# Patient Record
Sex: MALE | Race: WHITE | NOT HISPANIC OR LATINO | URBAN - METROPOLITAN AREA
[De-identification: names, ages, dates, MRNs, and addresses within clinical notes are randomized per-mention and may not be internally consistent; named-entity substitution may affect disease eponyms.]

---

## 2018-08-17 ENCOUNTER — EMERGENCY (EMERGENCY)
Facility: HOSPITAL | Age: 73
LOS: 1 days | Discharge: HOME | End: 2018-08-17
Attending: EMERGENCY MEDICINE | Admitting: EMERGENCY MEDICINE
Payer: MEDICARE

## 2018-08-17 VITALS
HEART RATE: 95 BPM | SYSTOLIC BLOOD PRESSURE: 132 MMHG | DIASTOLIC BLOOD PRESSURE: 77 MMHG | RESPIRATION RATE: 20 BRPM | TEMPERATURE: 99 F | OXYGEN SATURATION: 94 %

## 2018-08-17 DIAGNOSIS — M79.673 PAIN IN UNSPECIFIED FOOT: ICD-10-CM

## 2018-08-17 PROCEDURE — 73630 X-RAY EXAM OF FOOT: CPT

## 2018-08-17 PROCEDURE — 99283 EMERGENCY DEPT VISIT LOW MDM: CPT

## 2018-08-17 PROCEDURE — 99284 EMERGENCY DEPT VISIT MOD MDM: CPT

## 2018-08-17 PROCEDURE — 73630 X-RAY EXAM OF FOOT: CPT | Mod: 26,50

## 2018-08-17 RX ORDER — ACETAMINOPHEN 500 MG
975 TABLET ORAL ONCE
Qty: 0 | Refills: 0 | Status: COMPLETED | OUTPATIENT
Start: 2018-08-17 | End: 2018-08-17

## 2018-08-17 RX ADMIN — Medication 975 MILLIGRAM(S): at 23:02

## 2018-08-17 NOTE — ED ADULT NURSE NOTE - PMH
Anxiety    CAD (coronary artery disease)    CHF (congestive heart failure)    COPD (chronic obstructive pulmonary disease)    Depression    DM (diabetes mellitus)    GERD (gastroesophageal reflux disease)    Lung cancer    Renal insufficiency    Sleep apnea Anxiety    CAD (coronary artery disease)    CHF (congestive heart failure)    COPD (chronic obstructive pulmonary disease)    Depression    DM (diabetes mellitus)    GERD (gastroesophageal reflux disease)    Lung cancer    PVD (peripheral vascular disease)    Renal insufficiency    Sleep apnea

## 2018-08-17 NOTE — ED PROVIDER NOTE - OBJECTIVE STATEMENT
Pt from assisted living, hx cad, dm, chronic weakness. C/o bilateral foot pain today, no injury. No leg pain. Pt minimally ambulatory, has chronic leg swelling and hx pvd. Pt states no increase in leg swelling. no cp, no sob. pt states he has had similar pain in past, resolved with medication. Pt from assisted living, hx cad, dm with neuropathy, chronic weakness. C/o bilateral foot pain today, no injury. No leg pain. Pt minimally ambulatory, has chronic leg swelling and hx pvd. Pt states no increase in leg swelling. no cp, no sob. pt states he has had similar pain in past, resolved with medication.

## 2018-08-17 NOTE — ED PROVIDER NOTE - NS ED ROS FT
Except as otherwise indicated in HPI:  CONSTITUTIONAL: Neg  HEENT: neg  CV: neg  Resp: neg  GI: Neg  : Neg  MSK: +foot pain  SKIN: Neg  NEURO: Neg  PSYCHIATRIC: Neg  Heme/Onc: Neg

## 2018-08-17 NOTE — ED ADULT TRIAGE NOTE - CHIEF COMPLAINT QUOTE
pt co bilateral foot pain all day. right foot more than left. denies trauma. no obvious injury noted.

## 2018-08-17 NOTE — ED PROVIDER NOTE - PHYSICAL EXAMINATION
Gen:  alert, awake, chronically ill appearing  Head:  atraumatic, normocephalic  HEENT: PERRLA, EOMI, normal nose, normal oropharynx, no tonsillar edema, erythema, or exudate  CV:  rrr, nl S1, S2, no m/r/g  Pulm:  lungs CTA b/l  Abd: s/nt/nd, +BS  MSK:  moving all extremities, no back midline ttp, no stepoffs, no cva TTP; no calf ttp, mild b/l symmetric lower extremity edema (pt states at baseline), bp pulses b/l 1+ symmetric  Neuro:  grossly intact, no focal deficits  Skin:  sacral decub, dry, chronic b/l lower extremity pvd (pt states baseline and chronic, not increased)  Psych: AOx3, normal affect, no apparent risk to self or others Gen:  alert, awake, chronically ill appearing  Head:  atraumatic, normocephalic  HEENT: PERRLA, EOMI, normal nose, normal oropharynx  CV:  rrr, nl S1, S2, no m/r/g  Pulm:  lungs CTA b/l  Abd: s/nt/nd, +BS  MSK:  moving all extremities, no back midline ttp, no stepoffs, no cva TTP; no calf ttp, +ttp dorsum both feet and toes both feet, no wounds, mild/mod b/l symmetric lower extremity edema (pt states at baseline), bp pulses b/l 1+ symmetric  Neuro:  grossly intact, no focal deficits  Skin:  sacral decub, dry, chronic b/l lower extremity pvd (pt states baseline and chronic, not increased)  Psych: awake and alert, oriented, normal affect, no apparent risk to self or others Gen:  alert, awake, chronically ill appearing  Head:  atraumatic, normocephalic  HEENT: PERRLA  CV:  rrr, nl S1, S2, no m/r/g  Pulm:  lungs CTA b/l  Abd: s/nt/nd, +BS  MSK:  moving all extremities, no back midline ttp, no stepoffs, no cva TTP; no calf ttp, +ttp dorsum both feet and toes both feet, no wounds, mild/mod b/l symmetric lower extremity edema (pt states at baseline), bp pulses b/l 1+ symmetric; no coldness to extremities, no evidence of infection  Neuro:  grossly intact, no focal deficits  Skin:  sacral decub, dry, chronic b/l lower extremity pvd (pt states baseline and chronic, not increased)  Psych: awake and alert, oriented, normal affect, no apparent risk to self or others

## 2018-08-17 NOTE — ED ADULT NURSE NOTE - NSIMPLEMENTINTERV_GEN_ALL_ED
Implemented All Fall Risk Interventions:  [place] to call system. Call bell, personal items and telephone within reach. Instruct patient to call for assistance. Room bathroom lighting operational. Non-slip footwear when patient is off stretcher. Physically safe environment: no spills, clutter or unnecessary equipment. Stretcher in lowest position, wheels locked, appropriate side rails in place. Provide visual cue, wrist band, yellow gown, etc. Monitor gait and stability. Monitor for mental status changes and reorient to person, place, and time. Review medications for side effects contributing to fall risk. Reinforce activity limits and safety measures with patient and family.

## 2018-08-18 VITALS
RESPIRATION RATE: 18 BRPM | DIASTOLIC BLOOD PRESSURE: 67 MMHG | SYSTOLIC BLOOD PRESSURE: 113 MMHG | OXYGEN SATURATION: 94 %

## 2018-08-18 PROBLEM — Z00.00 ENCOUNTER FOR PREVENTIVE HEALTH EXAMINATION: Status: ACTIVE | Noted: 2018-08-18

## 2018-08-18 RX ORDER — ACETAMINOPHEN 500 MG
2 TABLET ORAL
Qty: 30 | Refills: 0 | OUTPATIENT
Start: 2018-08-18

## 2018-08-24 ENCOUNTER — EMERGENCY (EMERGENCY)
Facility: HOSPITAL | Age: 73
LOS: 1 days | Discharge: HOME | End: 2018-08-24
Attending: EMERGENCY MEDICINE | Admitting: EMERGENCY MEDICINE
Payer: MEDICARE

## 2018-08-24 VITALS
TEMPERATURE: 98 F | OXYGEN SATURATION: 94 % | HEART RATE: 53 BPM | RESPIRATION RATE: 16 BRPM | DIASTOLIC BLOOD PRESSURE: 51 MMHG | SYSTOLIC BLOOD PRESSURE: 97 MMHG | WEIGHT: 250 LBS

## 2018-08-24 VITALS
DIASTOLIC BLOOD PRESSURE: 53 MMHG | SYSTOLIC BLOOD PRESSURE: 118 MMHG | RESPIRATION RATE: 19 BRPM | HEART RATE: 58 BPM | OXYGEN SATURATION: 91 %

## 2018-08-24 PROBLEM — F41.9 ANXIETY DISORDER, UNSPECIFIED: Chronic | Status: ACTIVE | Noted: 2018-08-17

## 2018-08-24 PROBLEM — I73.9 PERIPHERAL VASCULAR DISEASE, UNSPECIFIED: Chronic | Status: ACTIVE | Noted: 2018-08-17

## 2018-08-24 PROBLEM — N28.9 DISORDER OF KIDNEY AND URETER, UNSPECIFIED: Chronic | Status: ACTIVE | Noted: 2018-08-17

## 2018-08-24 PROBLEM — I50.9 HEART FAILURE, UNSPECIFIED: Chronic | Status: ACTIVE | Noted: 2018-08-17

## 2018-08-24 PROBLEM — G47.30 SLEEP APNEA, UNSPECIFIED: Chronic | Status: ACTIVE | Noted: 2018-08-17

## 2018-08-24 PROBLEM — J44.9 CHRONIC OBSTRUCTIVE PULMONARY DISEASE, UNSPECIFIED: Chronic | Status: ACTIVE | Noted: 2018-08-17

## 2018-08-24 PROBLEM — C34.90 MALIGNANT NEOPLASM OF UNSPECIFIED PART OF UNSPECIFIED BRONCHUS OR LUNG: Chronic | Status: ACTIVE | Noted: 2018-08-17

## 2018-08-24 PROBLEM — K21.9 GASTRO-ESOPHAGEAL REFLUX DISEASE WITHOUT ESOPHAGITIS: Chronic | Status: ACTIVE | Noted: 2018-08-17

## 2018-08-24 PROBLEM — I25.10 ATHEROSCLEROTIC HEART DISEASE OF NATIVE CORONARY ARTERY WITHOUT ANGINA PECTORIS: Chronic | Status: ACTIVE | Noted: 2018-08-17

## 2018-08-24 PROBLEM — E11.9 TYPE 2 DIABETES MELLITUS WITHOUT COMPLICATIONS: Chronic | Status: ACTIVE | Noted: 2018-08-17

## 2018-08-24 PROBLEM — F32.9 MAJOR DEPRESSIVE DISORDER, SINGLE EPISODE, UNSPECIFIED: Chronic | Status: ACTIVE | Noted: 2018-08-17

## 2018-08-24 LAB
ALBUMIN SERPL ELPH-MCNC: 2.1 G/DL — LOW (ref 3.3–5)
ALP SERPL-CCNC: 85 U/L — SIGNIFICANT CHANGE UP (ref 40–120)
ALT FLD-CCNC: 17 U/L DA — SIGNIFICANT CHANGE UP (ref 10–45)
ANION GAP SERPL CALC-SCNC: 9 MMOL/L — SIGNIFICANT CHANGE UP (ref 5–17)
AST SERPL-CCNC: 21 U/L — SIGNIFICANT CHANGE UP (ref 10–40)
BASOPHILS # BLD AUTO: 0 K/UL — SIGNIFICANT CHANGE UP (ref 0–0.2)
BASOPHILS NFR BLD AUTO: 0.3 % — SIGNIFICANT CHANGE UP (ref 0–2)
BILIRUB SERPL-MCNC: 0.5 MG/DL — SIGNIFICANT CHANGE UP (ref 0.2–1.2)
BUN SERPL-MCNC: 42 MG/DL — HIGH (ref 7–23)
CALCIUM SERPL-MCNC: 7.9 MG/DL — LOW (ref 8.4–10.5)
CHLORIDE SERPL-SCNC: 102 MMOL/L — SIGNIFICANT CHANGE UP (ref 96–108)
CK SERPL-CCNC: 208 U/L — HIGH (ref 30–200)
CO2 SERPL-SCNC: 24 MMOL/L — SIGNIFICANT CHANGE UP (ref 22–31)
CREAT SERPL-MCNC: 2.69 MG/DL — HIGH (ref 0.5–1.3)
EOSINOPHIL # BLD AUTO: 0.2 K/UL — SIGNIFICANT CHANGE UP (ref 0–0.5)
EOSINOPHIL NFR BLD AUTO: 1.2 % — SIGNIFICANT CHANGE UP (ref 0–6)
GLUCOSE SERPL-MCNC: 195 MG/DL — HIGH (ref 70–99)
HCT VFR BLD CALC: 27.1 % — LOW (ref 39–50)
HGB BLD-MCNC: 9 G/DL — LOW (ref 13–17)
LYMPHOCYTES # BLD AUTO: 0.8 K/UL — LOW (ref 1–3.3)
LYMPHOCYTES # BLD AUTO: 6.4 % — LOW (ref 13–44)
MCHC RBC-ENTMCNC: 29.4 PG — SIGNIFICANT CHANGE UP (ref 27–34)
MCHC RBC-ENTMCNC: 33.2 GM/DL — SIGNIFICANT CHANGE UP (ref 32–36)
MCV RBC AUTO: 88.6 FL — SIGNIFICANT CHANGE UP (ref 80–100)
MONOCYTES # BLD AUTO: 1 K/UL — HIGH (ref 0–0.9)
MONOCYTES NFR BLD AUTO: 8 % — SIGNIFICANT CHANGE UP (ref 1–9)
NEUTROPHILS # BLD AUTO: 10.9 K/UL — HIGH (ref 1.8–7.4)
NEUTROPHILS NFR BLD AUTO: 84.1 % — HIGH (ref 43–77)
PLATELET # BLD AUTO: 166 K/UL — SIGNIFICANT CHANGE UP (ref 150–400)
POTASSIUM SERPL-MCNC: 4.2 MMOL/L — SIGNIFICANT CHANGE UP (ref 3.5–5.3)
POTASSIUM SERPL-SCNC: 4.2 MMOL/L — SIGNIFICANT CHANGE UP (ref 3.5–5.3)
PROT SERPL-MCNC: 6.9 G/DL — SIGNIFICANT CHANGE UP (ref 6–8.3)
RBC # BLD: 3.06 M/UL — LOW (ref 4.2–5.8)
RBC # FLD: 15.2 % — HIGH (ref 10.3–14.5)
SODIUM SERPL-SCNC: 135 MMOL/L — SIGNIFICANT CHANGE UP (ref 135–145)
TROPONIN I SERPL-MCNC: <.017 NG/ML — LOW (ref 0.02–0.06)
WBC # BLD: 13 K/UL — HIGH (ref 3.8–10.5)
WBC # FLD AUTO: 13 K/UL — HIGH (ref 3.8–10.5)

## 2018-08-24 PROCEDURE — 99285 EMERGENCY DEPT VISIT HI MDM: CPT

## 2018-08-24 PROCEDURE — 93010 ELECTROCARDIOGRAM REPORT: CPT

## 2018-08-24 PROCEDURE — 71250 CT THORAX DX C-: CPT | Mod: 26

## 2018-08-24 PROCEDURE — 71045 X-RAY EXAM CHEST 1 VIEW: CPT | Mod: 26

## 2018-08-24 RX ORDER — IPRATROPIUM/ALBUTEROL SULFATE 18-103MCG
3 AEROSOL WITH ADAPTER (GRAM) INHALATION
Qty: 0 | Refills: 0 | COMMUNITY

## 2018-08-24 RX ORDER — BUDESONIDE, MICRONIZED 100 %
0 POWDER (GRAM) MISCELLANEOUS
Qty: 0 | Refills: 0 | COMMUNITY

## 2018-08-24 RX ORDER — ACETAMINOPHEN 500 MG
2 TABLET ORAL
Qty: 0 | Refills: 0 | COMMUNITY

## 2018-08-24 RX ORDER — METOCLOPRAMIDE HCL 10 MG
0 TABLET ORAL
Qty: 0 | Refills: 0 | COMMUNITY

## 2018-08-24 RX ORDER — ASPIRIN/CALCIUM CARB/MAGNESIUM 324 MG
1 TABLET ORAL
Qty: 0 | Refills: 0 | COMMUNITY

## 2018-08-24 RX ORDER — DILTIAZEM HCL 120 MG
0 CAPSULE, EXT RELEASE 24 HR ORAL
Qty: 0 | Refills: 0 | COMMUNITY

## 2018-08-24 RX ORDER — SODIUM CHLORIDE 9 MG/ML
500 INJECTION INTRAMUSCULAR; INTRAVENOUS; SUBCUTANEOUS ONCE
Qty: 0 | Refills: 0 | Status: COMPLETED | OUTPATIENT
Start: 2018-08-24 | End: 2018-08-24

## 2018-08-24 RX ORDER — GABAPENTIN 400 MG/1
0 CAPSULE ORAL
Qty: 0 | Refills: 0 | COMMUNITY

## 2018-08-24 RX ORDER — OMEPRAZOLE 10 MG/1
1 CAPSULE, DELAYED RELEASE ORAL
Qty: 0 | Refills: 0 | COMMUNITY

## 2018-08-24 RX ORDER — ATORVASTATIN CALCIUM 80 MG/1
1 TABLET, FILM COATED ORAL
Qty: 0 | Refills: 0 | COMMUNITY

## 2018-08-24 RX ORDER — ALPRAZOLAM 0.25 MG
0 TABLET ORAL
Qty: 0 | Refills: 0 | COMMUNITY

## 2018-08-24 RX ADMIN — SODIUM CHLORIDE 500 MILLILITER(S): 9 INJECTION INTRAMUSCULAR; INTRAVENOUS; SUBCUTANEOUS at 12:08

## 2018-08-24 RX ADMIN — SODIUM CHLORIDE 500 MILLILITER(S): 9 INJECTION INTRAMUSCULAR; INTRAVENOUS; SUBCUTANEOUS at 13:00

## 2018-08-24 NOTE — ED ADULT TRIAGE NOTE - CHIEF COMPLAINT QUOTE
Hypotensive during PT at [facility]. Patient has a 20 gauge in right AC placed by EMS prior to arrival. Patient received 500cc of NS

## 2018-08-24 NOTE — ED PROVIDER NOTE - MEDICAL DECISION MAKING DETAILS
Asymptomatic patient with hypotension at [facility]. Improved after fluids. Will assess labs and ekg. Asymptomatic patient with hypotension at [facility]. Improved after fluids. Will assess labs and ekg.    Xray performed because of abnml breath sounds and that pt had o2 sat of 89% however, after an extended period of observation, /60 HR 61, O2 sat 94% with RR of 23/min. Asymptomatic patient with hypotension at [facility]. Improved after fluids. Will assess labs and ekg.    Xray performed because of abnml breath sounds and that pt had o2 sat of 89% however, after an extended period of observation, /60 HR 61, O2 sat 94% with RR of 23/min. Stable for d/c . D/W Family via phone as well.

## 2018-08-24 NOTE — ED ADULT NURSE NOTE - NSIMPLEMENTINTERV_GEN_ALL_ED
Implemented All Universal Safety Interventions:  [place] to call system. Call bell, personal items and telephone within reach. Instruct patient to call for assistance. Room bathroom lighting operational. Non-slip footwear when patient is off stretcher. Physically safe environment: no spills, clutter or unnecessary equipment. Stretcher in lowest position, wheels locked, appropriate side rails in place.

## 2018-08-24 NOTE — ED PROVIDER NOTE - CHPI ED SYMPTOMS NEG
no decreased eating/drinking/no nausea/no numbness/no tingling/no weakness/no dizziness/no fever/no vomiting

## 2018-08-24 NOTE — ED PROVIDER NOTE - PROGRESS NOTE DETAILS
D/w Family. Marlys, daughter. Patient BP improved. He is still without complaints. She is greatful that the ct chest was performed as he did require it from the thoracic surgeon Dr Lemos [phone] phone, fax [phone]; I contacted his office and they have accepted fax. Patient aware and approved.

## 2018-08-24 NOTE — ED PROVIDER NOTE - OBJECTIVE STATEMENT
While in therapy, the patient was noted to have a low blood pressure of 80/50. At the time, the patient has no complaints. He only complains of neck pain,w hich he has been complaining of for a while, he says, because of his mattress. Otherwise, no headache, no chest pain, no dizziness or lightheadedness. He had breakfast this morning. No abd pain nausea or vomiting. He said he is in rehab because he doesn't walk well and his daughter put him in there. EMS gave about 500cc of fluid prior to arrival. For EMS, his BP has been SBP of 100. Here, he is 100/42    Looking at his medical record, his cardizem was changed from 30 to 120mg.

## 2018-08-24 NOTE — ED ADULT NURSE NOTE - PMH
Anxiety    CAD (coronary artery disease)    CHF (congestive heart failure)    COPD (chronic obstructive pulmonary disease)    Depression    DM (diabetes mellitus)    GERD (gastroesophageal reflux disease)    Lung cancer    PVD (peripheral vascular disease)    Renal insufficiency    Sleep apnea

## 2018-08-24 NOTE — ED PROVIDER NOTE - SKIN WOUND TYPE
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 No active drainage. + area of erythema. No fluctuance or induration. Scars indicating previous drainage noted.

## 2018-08-24 NOTE — ED ADULT NURSE NOTE - CHIEF COMPLAINT QUOTE
Hypotensive during PT at [facility]. Patient has a 20 gauge in right AC placed by EMS prior to arrival. Patient received 500cc of NS

## 2018-08-25 ENCOUNTER — INPATIENT (INPATIENT)
Facility: HOSPITAL | Age: 73
LOS: 6 days | Discharge: OTHER INSTITUTION | DRG: 871 | End: 2018-09-01
Attending: INTERNAL MEDICINE | Admitting: STUDENT IN AN ORGANIZED HEALTH CARE EDUCATION/TRAINING PROGRAM
Payer: MEDICARE

## 2018-08-25 VITALS
RESPIRATION RATE: 24 BRPM | HEART RATE: 112 BPM | OXYGEN SATURATION: 86 % | DIASTOLIC BLOOD PRESSURE: 67 MMHG | SYSTOLIC BLOOD PRESSURE: 113 MMHG | TEMPERATURE: 101 F

## 2018-08-25 DIAGNOSIS — G93.41 METABOLIC ENCEPHALOPATHY: ICD-10-CM

## 2018-08-25 DIAGNOSIS — R33.9 RETENTION OF URINE, UNSPECIFIED: ICD-10-CM

## 2018-08-25 DIAGNOSIS — J44.1 CHRONIC OBSTRUCTIVE PULMONARY DISEASE WITH (ACUTE) EXACERBATION: ICD-10-CM

## 2018-08-25 DIAGNOSIS — N30.00 ACUTE CYSTITIS WITHOUT HEMATURIA: ICD-10-CM

## 2018-08-25 DIAGNOSIS — I25.10 ATHEROSCLEROTIC HEART DISEASE OF NATIVE CORONARY ARTERY WITHOUT ANGINA PECTORIS: ICD-10-CM

## 2018-08-25 DIAGNOSIS — G47.33 OBSTRUCTIVE SLEEP APNEA (ADULT) (PEDIATRIC): ICD-10-CM

## 2018-08-25 DIAGNOSIS — I24.8 OTHER FORMS OF ACUTE ISCHEMIC HEART DISEASE: ICD-10-CM

## 2018-08-25 DIAGNOSIS — J96.01 ACUTE RESPIRATORY FAILURE WITH HYPOXIA: ICD-10-CM

## 2018-08-25 DIAGNOSIS — A41.9 SEPSIS, UNSPECIFIED ORGANISM: ICD-10-CM

## 2018-08-25 DIAGNOSIS — I50.33 ACUTE ON CHRONIC DIASTOLIC (CONGESTIVE) HEART FAILURE: ICD-10-CM

## 2018-08-25 LAB
ALBUMIN SERPL ELPH-MCNC: 2.4 G/DL — LOW (ref 3.3–5)
ALP SERPL-CCNC: 100 U/L — SIGNIFICANT CHANGE UP (ref 40–120)
ALT FLD-CCNC: 22 U/L DA — SIGNIFICANT CHANGE UP (ref 10–45)
ANION GAP SERPL CALC-SCNC: 11 MMOL/L — SIGNIFICANT CHANGE UP (ref 5–17)
APTT BLD: 26.1 SEC — LOW (ref 27.5–37.4)
AST SERPL-CCNC: 25 U/L — SIGNIFICANT CHANGE UP (ref 10–40)
BILIRUB SERPL-MCNC: 0.7 MG/DL — SIGNIFICANT CHANGE UP (ref 0.2–1.2)
BLOOD GAS COMMENTS ARTERIAL: SIGNIFICANT CHANGE UP
BUN SERPL-MCNC: 41 MG/DL — HIGH (ref 7–23)
CALCIUM SERPL-MCNC: 8.5 MG/DL — SIGNIFICANT CHANGE UP (ref 8.4–10.5)
CHLORIDE SERPL-SCNC: 101 MMOL/L — SIGNIFICANT CHANGE UP (ref 96–108)
CK SERPL-CCNC: 132 U/L — SIGNIFICANT CHANGE UP (ref 30–200)
CO2 BLDA-SCNC: 21 MMOL/L — LOW (ref 22–30)
CO2 SERPL-SCNC: 23 MMOL/L — SIGNIFICANT CHANGE UP (ref 22–31)
CREAT SERPL-MCNC: 2.45 MG/DL — HIGH (ref 0.5–1.3)
GLUCOSE BLDC GLUCOMTR-MCNC: 162 MG/DL — HIGH (ref 70–99)
GLUCOSE BLDC GLUCOMTR-MCNC: 199 MG/DL — HIGH (ref 70–99)
GLUCOSE BLDC GLUCOMTR-MCNC: 201 MG/DL — HIGH (ref 70–99)
GLUCOSE SERPL-MCNC: 136 MG/DL — HIGH (ref 70–99)
HCT VFR BLD CALC: 29.6 % — LOW (ref 39–50)
HGB BLD-MCNC: 9.9 G/DL — LOW (ref 13–17)
HOROWITZ INDEX BLDA+IHG-RTO: SIGNIFICANT CHANGE UP
INR BLD: 1.32 RATIO — HIGH (ref 0.88–1.16)
LACTATE SERPL-SCNC: 2.2 MMOL/L — HIGH (ref 0.7–2)
MCHC RBC-ENTMCNC: 29.8 PG — SIGNIFICANT CHANGE UP (ref 27–34)
MCHC RBC-ENTMCNC: 33.3 GM/DL — SIGNIFICANT CHANGE UP (ref 32–36)
MCV RBC AUTO: 89.5 FL — SIGNIFICANT CHANGE UP (ref 80–100)
NT-PROBNP SERPL-SCNC: 5447 PG/ML — HIGH (ref 0–300)
PCO2 BLDA: 40 MMHG — SIGNIFICANT CHANGE UP (ref 32–46)
PH BLDA: 7.31 — LOW (ref 7.35–7.45)
PLATELET # BLD AUTO: 186 K/UL — SIGNIFICANT CHANGE UP (ref 150–400)
PO2 BLDA: 81 MMHG — SIGNIFICANT CHANGE UP (ref 74–108)
POTASSIUM SERPL-MCNC: 4.3 MMOL/L — SIGNIFICANT CHANGE UP (ref 3.5–5.3)
POTASSIUM SERPL-SCNC: 4.3 MMOL/L — SIGNIFICANT CHANGE UP (ref 3.5–5.3)
PROT SERPL-MCNC: 7.6 G/DL — SIGNIFICANT CHANGE UP (ref 6–8.3)
PROTHROM AB SERPL-ACNC: 14.7 SEC — HIGH (ref 9.8–12.7)
RBC # BLD: 3.31 M/UL — LOW (ref 4.2–5.8)
RBC # FLD: 15.4 % — HIGH (ref 10.3–14.5)
SAO2 % BLDA: 96 % — SIGNIFICANT CHANGE UP (ref 92–96)
SODIUM SERPL-SCNC: 135 MMOL/L — SIGNIFICANT CHANGE UP (ref 135–145)
TROPONIN I SERPL-MCNC: 0.88 NG/ML — HIGH (ref 0.02–0.06)
TROPONIN I SERPL-MCNC: 6.55 NG/ML — HIGH (ref 0.02–0.06)
WBC # BLD: 14.7 K/UL — HIGH (ref 3.8–10.5)
WBC # FLD AUTO: 14.7 K/UL — HIGH (ref 3.8–10.5)

## 2018-08-25 PROCEDURE — 71045 X-RAY EXAM CHEST 1 VIEW: CPT | Mod: 26

## 2018-08-25 PROCEDURE — 99223 1ST HOSP IP/OBS HIGH 75: CPT

## 2018-08-25 PROCEDURE — 93010 ELECTROCARDIOGRAM REPORT: CPT | Mod: 76

## 2018-08-25 PROCEDURE — 74176 CT ABD & PELVIS W/O CONTRAST: CPT | Mod: 26

## 2018-08-25 PROCEDURE — 99285 EMERGENCY DEPT VISIT HI MDM: CPT

## 2018-08-25 RX ORDER — ATORVASTATIN CALCIUM 80 MG/1
20 TABLET, FILM COATED ORAL AT BEDTIME
Qty: 0 | Refills: 0 | Status: DISCONTINUED | OUTPATIENT
Start: 2018-08-25 | End: 2018-09-01

## 2018-08-25 RX ORDER — DEXTROSE 50 % IN WATER 50 %
25 SYRINGE (ML) INTRAVENOUS ONCE
Qty: 0 | Refills: 0 | Status: DISCONTINUED | OUTPATIENT
Start: 2018-08-25 | End: 2018-09-01

## 2018-08-25 RX ORDER — ACETAMINOPHEN 500 MG
500 TABLET ORAL EVERY 6 HOURS
Qty: 0 | Refills: 0 | Status: DISCONTINUED | OUTPATIENT
Start: 2018-08-25 | End: 2018-08-25

## 2018-08-25 RX ORDER — ASPIRIN/CALCIUM CARB/MAGNESIUM 324 MG
81 TABLET ORAL DAILY
Qty: 0 | Refills: 0 | Status: DISCONTINUED | OUTPATIENT
Start: 2018-08-25 | End: 2018-09-01

## 2018-08-25 RX ORDER — INSULIN GLARGINE 100 [IU]/ML
40 INJECTION, SOLUTION SUBCUTANEOUS
Qty: 0 | Refills: 0 | COMMUNITY

## 2018-08-25 RX ORDER — AZTREONAM 2 G
1000 VIAL (EA) INJECTION ONCE
Qty: 0 | Refills: 0 | Status: COMPLETED | OUTPATIENT
Start: 2018-08-25 | End: 2018-08-25

## 2018-08-25 RX ORDER — ACETAMINOPHEN 500 MG
975 TABLET ORAL ONCE
Qty: 0 | Refills: 0 | Status: COMPLETED | OUTPATIENT
Start: 2018-08-25 | End: 2018-08-25

## 2018-08-25 RX ORDER — HEPARIN SODIUM 5000 [USP'U]/ML
5000 INJECTION INTRAVENOUS; SUBCUTANEOUS EVERY 8 HOURS
Qty: 0 | Refills: 0 | Status: DISCONTINUED | OUTPATIENT
Start: 2018-08-25 | End: 2018-08-25

## 2018-08-25 RX ORDER — GABAPENTIN 400 MG/1
300 CAPSULE ORAL AT BEDTIME
Qty: 0 | Refills: 0 | Status: DISCONTINUED | OUTPATIENT
Start: 2018-08-25 | End: 2018-09-01

## 2018-08-25 RX ORDER — DULOXETINE HYDROCHLORIDE 30 MG/1
30 CAPSULE, DELAYED RELEASE ORAL DAILY
Qty: 0 | Refills: 0 | Status: DISCONTINUED | OUTPATIENT
Start: 2018-08-25 | End: 2018-08-27

## 2018-08-25 RX ORDER — GLUCAGON INJECTION, SOLUTION 0.5 MG/.1ML
1 INJECTION, SOLUTION SUBCUTANEOUS ONCE
Qty: 0 | Refills: 0 | Status: DISCONTINUED | OUTPATIENT
Start: 2018-08-25 | End: 2018-09-01

## 2018-08-25 RX ORDER — IPRATROPIUM/ALBUTEROL SULFATE 18-103MCG
3 AEROSOL WITH ADAPTER (GRAM) INHALATION EVERY 4 HOURS
Qty: 0 | Refills: 0 | Status: DISCONTINUED | OUTPATIENT
Start: 2018-08-25 | End: 2018-08-28

## 2018-08-25 RX ORDER — INSULIN GLARGINE 100 [IU]/ML
40 INJECTION, SOLUTION SUBCUTANEOUS EVERY MORNING
Qty: 0 | Refills: 0 | Status: DISCONTINUED | OUTPATIENT
Start: 2018-08-25 | End: 2018-09-01

## 2018-08-25 RX ORDER — DULOXETINE HYDROCHLORIDE 30 MG/1
60 CAPSULE, DELAYED RELEASE ORAL DAILY
Qty: 0 | Refills: 0 | Status: DISCONTINUED | OUTPATIENT
Start: 2018-08-25 | End: 2018-08-25

## 2018-08-25 RX ORDER — FUROSEMIDE 40 MG
40 TABLET ORAL ONCE
Qty: 0 | Refills: 0 | Status: COMPLETED | OUTPATIENT
Start: 2018-08-25 | End: 2018-08-25

## 2018-08-25 RX ORDER — CARVEDILOL PHOSPHATE 80 MG/1
25 CAPSULE, EXTENDED RELEASE ORAL EVERY 12 HOURS
Qty: 0 | Refills: 0 | Status: DISCONTINUED | OUTPATIENT
Start: 2018-08-25 | End: 2018-09-01

## 2018-08-25 RX ORDER — BUDESONIDE, MICRONIZED 100 %
1 POWDER (GRAM) MISCELLANEOUS DAILY
Qty: 0 | Refills: 0 | Status: DISCONTINUED | OUTPATIENT
Start: 2018-08-25 | End: 2018-09-01

## 2018-08-25 RX ORDER — ASPIRIN/CALCIUM CARB/MAGNESIUM 324 MG
325 TABLET ORAL ONCE
Qty: 0 | Refills: 0 | Status: COMPLETED | OUTPATIENT
Start: 2018-08-25 | End: 2018-08-25

## 2018-08-25 RX ORDER — AZTREONAM 2 G
500 VIAL (EA) INJECTION EVERY 12 HOURS
Qty: 0 | Refills: 0 | Status: DISCONTINUED | OUTPATIENT
Start: 2018-08-25 | End: 2018-09-01

## 2018-08-25 RX ORDER — HEPARIN SODIUM 5000 [USP'U]/ML
INJECTION INTRAVENOUS; SUBCUTANEOUS
Qty: 25000 | Refills: 0 | Status: DISCONTINUED | OUTPATIENT
Start: 2018-08-25 | End: 2018-08-27

## 2018-08-25 RX ORDER — HEPARIN SODIUM 5000 [USP'U]/ML
6000 INJECTION INTRAVENOUS; SUBCUTANEOUS EVERY 6 HOURS
Qty: 0 | Refills: 0 | Status: DISCONTINUED | OUTPATIENT
Start: 2018-08-25 | End: 2018-09-01

## 2018-08-25 RX ORDER — DEXTROSE 50 % IN WATER 50 %
12.5 SYRINGE (ML) INTRAVENOUS ONCE
Qty: 0 | Refills: 0 | Status: DISCONTINUED | OUTPATIENT
Start: 2018-08-25 | End: 2018-09-01

## 2018-08-25 RX ORDER — VANCOMYCIN HCL 1 G
1000 VIAL (EA) INTRAVENOUS ONCE
Qty: 0 | Refills: 0 | Status: DISCONTINUED | OUTPATIENT
Start: 2018-08-25 | End: 2018-08-25

## 2018-08-25 RX ORDER — DULOXETINE HYDROCHLORIDE 30 MG/1
1 CAPSULE, DELAYED RELEASE ORAL
Qty: 0 | Refills: 0 | COMMUNITY

## 2018-08-25 RX ORDER — DEXTROSE 50 % IN WATER 50 %
15 SYRINGE (ML) INTRAVENOUS ONCE
Qty: 0 | Refills: 0 | Status: DISCONTINUED | OUTPATIENT
Start: 2018-08-25 | End: 2018-09-01

## 2018-08-25 RX ORDER — SODIUM CHLORIDE 9 MG/ML
1000 INJECTION, SOLUTION INTRAVENOUS
Qty: 0 | Refills: 0 | Status: DISCONTINUED | OUTPATIENT
Start: 2018-08-25 | End: 2018-09-01

## 2018-08-25 RX ORDER — HEPARIN SODIUM 5000 [USP'U]/ML
5000 INJECTION INTRAVENOUS; SUBCUTANEOUS ONCE
Qty: 0 | Refills: 0 | Status: COMPLETED | OUTPATIENT
Start: 2018-08-25 | End: 2018-08-25

## 2018-08-25 RX ORDER — TAMSULOSIN HYDROCHLORIDE 0.4 MG/1
0.8 CAPSULE ORAL AT BEDTIME
Qty: 0 | Refills: 0 | Status: DISCONTINUED | OUTPATIENT
Start: 2018-08-25 | End: 2018-09-01

## 2018-08-25 RX ORDER — METOCLOPRAMIDE HCL 10 MG
10 TABLET ORAL AT BEDTIME
Qty: 0 | Refills: 0 | Status: DISCONTINUED | OUTPATIENT
Start: 2018-08-25 | End: 2018-09-01

## 2018-08-25 RX ORDER — PANTOPRAZOLE SODIUM 20 MG/1
40 TABLET, DELAYED RELEASE ORAL
Qty: 0 | Refills: 0 | Status: DISCONTINUED | OUTPATIENT
Start: 2018-08-25 | End: 2018-09-01

## 2018-08-25 RX ORDER — ALPRAZOLAM 0.25 MG
0.5 TABLET ORAL AT BEDTIME
Qty: 0 | Refills: 0 | Status: COMPLETED | OUTPATIENT
Start: 2018-08-25 | End: 2018-09-01

## 2018-08-25 RX ORDER — INSULIN LISPRO 100/ML
VIAL (ML) SUBCUTANEOUS
Qty: 0 | Refills: 0 | Status: DISCONTINUED | OUTPATIENT
Start: 2018-08-25 | End: 2018-09-01

## 2018-08-25 RX ORDER — ACETAMINOPHEN 500 MG
650 TABLET ORAL EVERY 6 HOURS
Qty: 0 | Refills: 0 | Status: DISCONTINUED | OUTPATIENT
Start: 2018-08-25 | End: 2018-09-01

## 2018-08-25 RX ORDER — CLOPIDOGREL BISULFATE 75 MG/1
75 TABLET, FILM COATED ORAL DAILY
Qty: 0 | Refills: 0 | Status: DISCONTINUED | OUTPATIENT
Start: 2018-08-25 | End: 2018-09-01

## 2018-08-25 RX ADMIN — Medication 100 MILLIGRAM(S): at 12:01

## 2018-08-25 RX ADMIN — HEPARIN SODIUM 5000 UNIT(S): 5000 INJECTION INTRAVENOUS; SUBCUTANEOUS at 18:51

## 2018-08-25 RX ADMIN — CLOPIDOGREL BISULFATE 75 MILLIGRAM(S): 75 TABLET, FILM COATED ORAL at 22:38

## 2018-08-25 RX ADMIN — HEPARIN SODIUM 1000 UNIT(S)/HR: 5000 INJECTION INTRAVENOUS; SUBCUTANEOUS at 22:37

## 2018-08-25 RX ADMIN — Medication 40 MILLIGRAM(S): at 21:49

## 2018-08-25 RX ADMIN — Medication 975 MILLIGRAM(S): at 12:01

## 2018-08-25 RX ADMIN — ATORVASTATIN CALCIUM 20 MILLIGRAM(S): 80 TABLET, FILM COATED ORAL at 21:59

## 2018-08-25 RX ADMIN — TAMSULOSIN HYDROCHLORIDE 0.8 MILLIGRAM(S): 0.4 CAPSULE ORAL at 21:54

## 2018-08-25 RX ADMIN — Medication 4: at 18:45

## 2018-08-25 RX ADMIN — Medication 3 MILLILITER(S): at 20:40

## 2018-08-25 RX ADMIN — Medication 325 MILLIGRAM(S): at 12:42

## 2018-08-25 RX ADMIN — GABAPENTIN 300 MILLIGRAM(S): 400 CAPSULE ORAL at 21:59

## 2018-08-25 RX ADMIN — Medication 40 MILLIGRAM(S): at 18:48

## 2018-08-25 RX ADMIN — Medication 50 MILLIGRAM(S): at 18:54

## 2018-08-25 RX ADMIN — Medication 0.5 MILLIGRAM(S): at 21:40

## 2018-08-25 RX ADMIN — HEPARIN SODIUM 5000 UNIT(S): 5000 INJECTION INTRAVENOUS; SUBCUTANEOUS at 22:44

## 2018-08-25 RX ADMIN — Medication 10 MILLIGRAM(S): at 21:49

## 2018-08-25 NOTE — CHART NOTE - NSCHARTNOTEFT_GEN_A_CORE
Pt re-evaluated - lab with trop 6.555 up from 0.875.  Pt is 73M hx of HTN, T2IRDM, CAD hx of MI, CHF,  COPD, CKD, HLD, CHELSEA on CPAP pw fever, SOB and admitted for sepsis sec to presumed UTI with elevated trop felt initially sec to demand ischemia. Pt currently feels improved - was SOB earlier, s/p Lasix and duoneb and now on CPAP resting comfortably. Denied any chest pain. Was in ED yesterday and sent back to [place] after an episode of asx hypotension that resolved after IVFs. Had CT chest yesterday that was negative.   Vital Signs Last 24 Hrs  T(C): 36.5 (25 Aug 2018 19:49), Max: 38.1 (25 Aug 2018 10:32)  T(F): 97.7 (25 Aug 2018 19:49), Max: 100.5 (25 Aug 2018 10:32)  HR: 68 (25 Aug 2018 20:43) (68 - 112)  BP: 128/67 (25 Aug 2018 19:49) (102/61 - 128/67)  BP(mean): --  RR: 15 (25 Aug 2018 19:49) (15 - 24)  SpO2: 98% (25 Aug 2018 21:36) (86% - 98%)  Vital Signs Last 24 Hrs  T(C): 36.5 (25 Aug 2018 19:49), Max: 38.1 (25 Aug 2018 10:32)  T(F): 97.7 (25 Aug 2018 19:49), Max: 100.5 (25 Aug 2018 10:32)  HR: 68 (25 Aug 2018 20:43) (68 - 112)  BP: 128/67 (25 Aug 2018 19:49) (102/61 - 128/67)  BP(mean): --  RR: 15 (25 Aug 2018 19:49) (15 - 24)  SpO2: 98% (25 Aug 2018 21:36) (86% - 98%)  Daily Height in cm: 182.88 (25 Aug 2018 19:49)    Daily Weight in k.6 (25 Aug 2018 21:36)  CAPILLARY BLOOD GLUCOSE      POCT Blood Glucose.: 162 mg/dL (25 Aug 2018 21:35)    I&O's Summary    GENERAL: NAD  HEAD:  Normocephalic  EYES: EOMI, PERRLA, conjunctiva and sclera clear  ENMT: No tonsillar erythema, exudates, or enlargement; Moist mucous membranes, No lesions  NECK: Supple, No JVD, no bruit, normal thyroid  NERVOUS SYSTEM:  Alert, moves all fours.   CHEST/LUNG: occ rhonchi, occ exp wheeze.   HEART: Regular rate and rhythm; No murmurs, rubs, or gallops  ABDOMEN: Soft, Nontender, Nondistended; Bowel sounds present  EXTREMITIES:  2+ Peripheral Pulses, No clubbing, cyanosis, ++ edema  LYMPH: No lymphadenopathy noted  SKIN: No rashes or lesions    CARDIAC MARKERS ( 25 Aug 2018 20:14 )  6.555 ng/mL / x     / x     / x     / x      CARDIAC MARKERS ( 25 Aug 2018 08:30 )  .875 ng/mL / x     / 132 U/L / x     / x      CARDIAC MARKERS ( 24 Aug 2018 12:00 )  <.017 ng/mL / x     / 208 U/L / x     / x        Repeat EKG: RBBB at 61 bpm no st changes.     AP: 73M hx of CAD/MI, HTN, DM, CHF, COPD, CKD with NSTEMI  case discussed with cardiologist Dr. Jo. will add on Plavix, IV heparin.   cont asa, statin and bb Pt re-evaluated - lab with trop 6.555 up from 0.875.  Pt is 73M hx of HTN, T2IRDM, CAD hx of MI, CHF,  COPD, CKD, HLD, CHELSEA on CPAP pw fever, SOB and admitted for sepsis sec to presumed UTI with elevated trop felt initially sec to demand ischemia. Pt currently feels improved - was SOB earlier, s/p Lasix and duoneb and now on CPAP resting comfortably. Denied any chest pain. Was in ED yesterday and sent back to [place] after an episode of asx hypotension that resolved after IVFs. Had CT chest yesterday that was negative.   Vital Signs Last 24 Hrs  T(C): 36.5 (25 Aug 2018 19:49), Max: 38.1 (25 Aug 2018 10:32)  T(F): 97.7 (25 Aug 2018 19:49), Max: 100.5 (25 Aug 2018 10:32)  HR: 68 (25 Aug 2018 20:43) (68 - 112)  BP: 128/67 (25 Aug 2018 19:49) (102/61 - 128/67)  BP(mean): --  RR: 15 (25 Aug 2018 19:49) (15 - 24)  SpO2: 98% (25 Aug 2018 21:36) (86% - 98%)  Vital Signs Last 24 Hrs  T(C): 36.5 (25 Aug 2018 19:49), Max: 38.1 (25 Aug 2018 10:32)  T(F): 97.7 (25 Aug 2018 19:49), Max: 100.5 (25 Aug 2018 10:32)  HR: 68 (25 Aug 2018 20:43) (68 - 112)  BP: 128/67 (25 Aug 2018 19:49) (102/61 - 128/67)  BP(mean): --  RR: 15 (25 Aug 2018 19:49) (15 - 24)  SpO2: 98% (25 Aug 2018 21:36) (86% - 98%)  Daily Height in cm: 182.88 (25 Aug 2018 19:49)    Daily Weight in k.6 (25 Aug 2018 21:36)    CAPILLARY BLOOD GLUCOSE    POCT Blood Glucose.: 162 mg/dL (25 Aug 2018 21:35)    I&O's Summary    GENERAL: NAD  HEAD:  Normocephalic  EYES: EOMI, PERRLA, conjunctiva and sclera clear  ENMT: No tonsillar erythema, exudates, or enlargement; Moist mucous membranes, No lesions  NECK: Supple, No JVD, no bruit, normal thyroid  NERVOUS SYSTEM:  Alert, moves all fours.   CHEST/LUNG: occ rhonchi, occ exp wheeze.   HEART: Regular rate and rhythm; No murmurs, rubs, or gallops  ABDOMEN: Soft, Nontender, Nondistended; Bowel sounds present  EXTREMITIES:  2+ Peripheral Pulses, No clubbing, cyanosis, ++ edema  LYMPH: No lymphadenopathy noted  SKIN: No rashes or lesions    CARDIAC MARKERS ( 25 Aug 2018 20:14 )  6.555 ng/mL / x     / x     / x     / x      CARDIAC MARKERS ( 25 Aug 2018 08:30 )  .875 ng/mL / x     / 132 U/L / x     / x      CARDIAC MARKERS ( 24 Aug 2018 12:00 )  <.017 ng/mL / x     / 208 U/L / x     / x        Repeat EKG: RBBB at 61 bpm no st changes.     AP: 73M hx of CAD/MI, HTN, DM, CHF, COPD, CKD with NSTEMI  case discussed with cardiologist Dr. Jo. will add on Plavix, IV heparin.   cont asa, statin and bb

## 2018-08-25 NOTE — ED PROVIDER NOTE - NS ED ROS FT
Constitutional: (-+) fever  (+)chills  (-)sweats  Eyes/ENT: (-) blurry vision, (-) epistaxis  (-)rhinorrhea   (-) sore throat    Cardiovascular: (-) chest pain, (-) palpitations (-) edema   Respiratory: (-) cough, (-) shortness of breath   Gastrointestinal: (-)nausea  (-)vomiting, (-) diarrhea  (-) abdominal pain   :  (-)dysuria, (+)frequency, (+)urgency, (-)hematuria  Musculoskeletal: (-) neck pain, (-) back pain, (-) joint pain  Integumentary: (-) rash, (-) edema  Neurological: (-) headache, (-) altered mental status  (-)LOC

## 2018-08-25 NOTE — ED ADULT NURSE NOTE - CHPI ED NUR SYMPTOMS NEG
no edema/no headache/no body aches/no cough/no wheezing/no chest pain/no diaphoresis/no fever/no hemoptysis/no chills/no shortness of breath

## 2018-08-25 NOTE — CONSULT NOTE ADULT - SUBJECTIVE AND OBJECTIVE BOX
NEPHROLOGY CONSULTATION    CHIEF COMPLAINT: HARINDER    HPI:  Pt is 74 yo M from [place] presented with weakness, shortness of breath, fever, seen in ED yesterday, negative CT chest, sent back, today returns with fever unable to urinate. Noted to have HARINDER. Most recent known Cr 1.61 on 9/14/15. Recent baseline unknown. Denies CP, c/o MUELLER, no N/V, D/C. Alert, poor historian. Elevated troponin noted. CT A/P c/w cystitis.    ROS:  as above    Allergies:  Avelox (Unknown)  cefadroxil (Unknown)  levofloxacin (Unknown)  sulfa drugs (Unknown)    PAST MEDICAL & SURGICAL HISTORY:  PVD (peripheral vascular disease)  Depression  Renal insufficiency  Anxiety  Sleep apnea  Lung cancer  DM (diabetes mellitus)  GERD (gastroesophageal reflux disease)  CAD (coronary artery disease)  COPD (chronic obstructive pulmonary disease)  CHF (congestive heart failure)  No significant past surgical history    SOCIAL HISTORY:  negative    FAMILY HISTORY:  No pertinent family history in first degree relatives    MEDICATIONS  (STANDING):  ALBUTerol/ipratropium for Nebulization 3 milliLiter(s) Nebulizer every 4 hours  ALPRAZolam 0.5 milliGRAM(s) Oral at bedtime  aspirin  chewable 81 milliGRAM(s) Oral daily  atorvastatin 20 milliGRAM(s) Oral at bedtime  aztreonam  IVPB 500 milliGRAM(s) IV Intermittent every 12 hours  buDESOnide   0.5 milliGRAM(s) Respule 1 milliGRAM(s) Inhalation daily  carvedilol 25 milliGRAM(s) Oral every 12 hours  dextrose 5%. 1000 milliLiter(s) (50 mL/Hr) IV Continuous <Continuous>  dextrose 50% Injectable 12.5 Gram(s) IV Push once  dextrose 50% Injectable 25 Gram(s) IV Push once  dextrose 50% Injectable 25 Gram(s) IV Push once  DULoxetine 30 milliGRAM(s) Oral daily  gabapentin 300 milliGRAM(s) Oral at bedtime  heparin  Injectable 5000 Unit(s) SubCutaneous every 8 hours  insulin glargine Injectable (LANTUS) 40 Unit(s) SubCutaneous every morning  insulin lispro (HumaLOG) corrective regimen sliding scale   SubCutaneous three times a day before meals  methylPREDNISolone sodium succinate Injectable 40 milliGRAM(s) IV Push every 8 hours  metoclopramide 10 milliGRAM(s) Oral at bedtime  pantoprazole    Tablet 40 milliGRAM(s) Oral before breakfast  tamsulosin 0.8 milliGRAM(s) Oral at bedtime    Vital Signs Last 24 Hrs  T(C): 37 (08-25-18 @ 19:03), Max: 38.1 (08-25-18 @ 10:32)  T(F): 98.6 (08-25-18 @ 19:03), Max: 100.5 (08-25-18 @ 10:32)  HR: 68 (08-25-18 @ 19:03) (68 - 112)  BP: 127/74 (08-25-18 @ 19:03) (102/61 - 127/74)  RR: 16 (08-25-18 @ 19:03) (16 - 24)  SpO2: 97% (08-25-18 @ 19:03) (86% - 97%)    Seen in ER  PERRLA, EOMI  Neck non tender, supple  Lungs good air entry b/l  Heart S1S2  Abdomen soft, ND  Extr + edema    LABS:                        9.9    14.7  )-----------( 186      ( 25 Aug 2018 08:30 )             29.6     08-25    135  |  101  |  41<H>  ----------------------------<  136<H>  4.3   |  23  |  2.45<H>    Ca    8.5      25 Aug 2018 08:30    TPro  7.6  /  Alb  2.4<L>  /  TBili  0.7  /  DBili  x   /  AST  25  /  ALT  22  /  AlkPhos  100  08-25    LIVER FUNCTIONS - ( 25 Aug 2018 08:30 )  Alb: 2.4 g/dL / Pro: 7.6 g/dL / ALK PHOS: 100 U/L / ALT: 22 U/L DA / AST: 25 U/L / GGT: x           PT/INR - ( 25 Aug 2018 08:30 )   PT: 14.7 sec;   INR: 1.32 ratio      PTT - ( 25 Aug 2018 08:30 )  PTT:26.1 sec    A/P:    HARINDER on CKD III in setting of cystitis, ? NSTEMI  Abx, ID eval, Cards eval  Gentle IVF  No nephrotoxins  CBC, BMP, CPK in am  UA, urine C and S  F/u Bld Cx  Echo  Will f/u

## 2018-08-25 NOTE — H&P ADULT - HISTORY OF PRESENT ILLNESS
Pt is a 73 y o M from [place] presented with weakness, shortness of breath, fever, seen in ED yesterday, negative CT chest, sent home, today with fever. Pt states he has had trouble urinating for the last few days.

## 2018-08-25 NOTE — H&P ADULT - PROBLEM SELECTOR PLAN 1
Continue Aztreonam, pt did not have any urine from straight cath will have to try again, CT abd to evaluate for hydronephrosis

## 2018-08-25 NOTE — ED ADULT NURSE NOTE - NSIMPLEMENTINTERV_GEN_ALL_ED
Implemented All Fall with Harm Risk Interventions:  [place] to call system. Call bell, personal items and telephone within reach. Instruct patient to call for assistance. Room bathroom lighting operational. Non-slip footwear when patient is off stretcher. Physically safe environment: no spills, clutter or unnecessary equipment. Stretcher in lowest position, wheels locked, appropriate side rails in place. Provide visual cue, wrist band, yellow gown, etc. Monitor gait and stability. Monitor for mental status changes and reorient to person, place, and time. Review medications for side effects contributing to fall risk. Reinforce activity limits and safety measures with patient and family. Provide visual clues: red socks.

## 2018-08-25 NOTE — ED PROVIDER NOTE - MEDICAL DECISION MAKING DETAILS
73 y m cc fever generalized weakness, cxr clear wbc 14 k , ua unable to obtain despite of hydration straight cath, + troponins

## 2018-08-25 NOTE — ED ADULT NURSE NOTE - OBJECTIVE STATEMENT
73 year old male A&Ox3. Chief complaint, shortness of breath and weakness for the last two days. Patient states he was in the ED yesterday for shortness of breath but feels like he's getting worse. Patient states he also has wounds to his sacrum for "a few months" that wont heal. Patient his bilateral sacral wounds 1-2 cm which are stage 2-3 with no induration and serosanguinous discharge. Patient states pain to the area is 5-6 when palpated. Patient also has a un-blanchable stage 1 that surrounds the aforementioned wounds that is 9X6 cm. Patient has no other complaints. Patient has a 20 gauge in his right A/C which is intact and flushable.

## 2018-08-25 NOTE — CHART NOTE - NSCHARTNOTEFT_GEN_A_CORE
-patient admitted for AECOPD  -uses CPAP at home, during sleep  -he states his settings are "CPAP 17 and 3Liters of oxygen"    -will start him on CPAP 10 and 50% FIO2 tonight and adjust if needed.    -no other acute issues or complaints at this     ABG on 4L NC showed:    ABG - ( 25 Aug 2018 12:40 )  pH, Arterial: 7.31  pH, Blood: x     /  pCO2: 40    /  pO2: 81    / HCO3: x     / Base Excess: x     /  SaO2: 96            -have discussed with respiratory therapy and bedside NR

## 2018-08-25 NOTE — H&P ADULT - NSHPPHYSICALEXAM_GEN_ALL_CORE
T(C): 37.8 (08-25-18 @ 11:32), Max: 38.1 (08-25-18 @ 10:32)  HR: 102 (08-25-18 @ 11:32) (58 - 112)  BP: 102/61 (08-25-18 @ 11:32) (102/61 - 130/66)  RR: 18 (08-25-18 @ 11:32) (18 - 24)  SpO2: 91% (08-25-18 @ 11:32) (86% - 94%)    PHYSICAL EXAM:  GENERAL: lethargic but arousable  HEAD:  Atraumatic, Normocephalic  EYES: EOMI, PERRLA, conjunctiva and sclera clear  ENMT: dry mucous membranes, Good dentition  NECK: Supple, No JVD  NERVOUS SYSTEM:  A/O x3, decreased concentration; CN 2-12 intact, No focal deficits, moves all extremities, 4/5 UE and LE  CHEST/LUNG: Clear to auscultation bilaterally; No rales, rhonchi, wheezing, or rubs  HEART: Regular rate and rhythm; S1/S2, No murmurs, rubs, or gallops  ABDOMEN: Soft, Nontender, Nondistended; Bowel sounds present  VASCULAR: Normal pulses, Normal capillary refill  EXTREMITIES:  2+ Peripheral Pulses, No clubbing, cyanosis; 2+ edema  SKIN: Warm, Intact, B/L LE stasis dermatitis  PSYCH: Normal mood and affect

## 2018-08-25 NOTE — H&P ADULT - ASSESSMENT
Pt is a 73 y o M from [place] presented with weakness, fever admitted for severe sepsis 2/2 presumed UTI, urinary retention, not making urine, hypovolemic, acute metabolic encephalopathy 2/2 infection, ARF on CKD IV.    DVT PPX - see DVT form scanned, will give Heparin SQ    Anemia - 2/2 chronic renal disease likely, monitor, if drops may need to transfuse    CKD IV - Renal consult

## 2018-08-25 NOTE — H&P ADULT - NSHPREVIEWOFSYSTEMS_GEN_ALL_CORE
REVIEW OF SYSTEMS:  CONSTITUTIONAL: + fever, + fatigue, +lethargy  EYES: No eye pain, visual disturbances, or discharge  ENMT:  No difficulty hearing, tinnitus, vertigo; No sinus or throat pain  NECK: No neck pain or neck stiffness  RESPIRATORY: No cough, wheezing, chills or hemoptysis; +shortness of breath  CARDIOVASCULAR: No chest pain, palpitations, dizziness, or leg swelling  GASTROINTESTINAL: No abdominal pain, No nausea, vomiting, or hematemesis; No diarrhea or constipation  GENITOURINARY: No dysuria, frequency, hematuria, or incontinence; +retention  NEUROLOGICAL: +headaches, -memory loss, +overall loss of strength, -numbness, or tremors  SKIN: No itching, burning, rashes, or lesions   ENDOCRINE: No heat or cold intolerance; No hair loss  MUSCULOSKELETAL: No joint pain or swelling; No muscle, back, or extremity pain  PSYCHIATRIC: No depression, anxiety, mood swings, or difficulty sleeping  HEME/LYMPH: No easy bruising or bleeding  ALLERY AND IMMUNOLOGIC: No hives or eczema    All other ROS reviewed and negative except as otherwise stated

## 2018-08-25 NOTE — ED PROVIDER NOTE - PHYSICAL EXAMINATION
General:     NAD, frail elderly, ill-appearing   Head:     NC/AT, EOMI, oral mucosa dry  Neck:     trachea midline  Lungs:     CTA b/l, no w/r/r  CVS:     S1S2, RRR, no m/g/r  Abd:     +BS, s/nt/nd, no organomegaly  Ext:    2+ radial and pedal pulses, no c/c/e  Neuro: AAOx3, no sensory/motor deficits

## 2018-08-25 NOTE — PATIENT PROFILE ADULT. - FALL HARM RISK
other/h/o fall/bones(Osteoporosis,prev fx,steroid use,metastatic bone ca/coagulation(Bleeding disorder R/T clinical cond/anti-coags)

## 2018-08-25 NOTE — H&P ADULT - NSHPLABSRESULTS_GEN_ALL_CORE
9.9    14.7  )-----------( 186      ( 25 Aug 2018 08:30 )             29.6     08-25    135  |  101  |  41<H>  ----------------------------<  136<H>  4.3   |  23  |  2.45<H>    Ca    8.5      25 Aug 2018 08:30    TPro  7.6  /  Alb  2.4<L>  /  TBili  0.7  /  DBili  x   /  AST  25  /  ALT  22  /  AlkPhos  100  08-25      < from: Xray Chest 1 View- PORTABLE-Routine (08.25.18 @ 12:01) >    EXAM:  XR CHEST PORTABLE ROUTINE 1V      PROCEDURE DATE:  08/25/2018        INTERPRETATION:  Clinical information: Shortness of breath.    Technique: Frontal view of the chest.    Comparison: Prior chest x-ray examination from 8/24/2018.    Findings: The lungs are clear. There is volume loss of the right lung.   The heart size is enlarged. There are mild multilevel degenerative   changes of the thoracic spine.    IMPRESSION: Unchanged cardiomegaly.    < from: CT Chest No Cont (08.24.18 @ 13:34) >    EXAM:  CT CHEST      PROCEDURE DATE:  08/24/2018        INTERPRETATION:  CT chest without contrast  There are no relevant prior studies for comparison  History hypoxia    There is moderate cardiomegaly. There is mild chronic appearing probably   postsurgical consolidation in the anterior right apex. There are several   small nodules in the upper lung ranging up to 1.5 cm in long axis   dimension. The left hemithorax is clear. There is no cavitation or   pleural or pericardial effusion. There ismoderate gynecomastia. There   are multiple right-sided renal cysts.    IMPRESSION: Findings in the right lung consistent with previous pulmonary   resection with residual or recurrent nodularity. No acute consolidation   or effusion or central edema.

## 2018-08-26 DIAGNOSIS — N28.9 DISORDER OF KIDNEY AND URETER, UNSPECIFIED: ICD-10-CM

## 2018-08-26 DIAGNOSIS — E11.9 TYPE 2 DIABETES MELLITUS WITHOUT COMPLICATIONS: ICD-10-CM

## 2018-08-26 DIAGNOSIS — I21.4 NON-ST ELEVATION (NSTEMI) MYOCARDIAL INFARCTION: ICD-10-CM

## 2018-08-26 DIAGNOSIS — D64.9 ANEMIA, UNSPECIFIED: ICD-10-CM

## 2018-08-26 DIAGNOSIS — I50.9 HEART FAILURE, UNSPECIFIED: ICD-10-CM

## 2018-08-26 DIAGNOSIS — I10 ESSENTIAL (PRIMARY) HYPERTENSION: ICD-10-CM

## 2018-08-26 DIAGNOSIS — N18.4 CHRONIC KIDNEY DISEASE, STAGE 4 (SEVERE): ICD-10-CM

## 2018-08-26 LAB
ANION GAP SERPL CALC-SCNC: 8 MMOL/L — SIGNIFICANT CHANGE UP (ref 5–17)
APTT BLD: 36.5 SEC — SIGNIFICANT CHANGE UP (ref 27.5–37.4)
APTT BLD: 45.5 SEC — HIGH (ref 27.5–37.4)
APTT BLD: 47 SEC — HIGH (ref 27.5–37.4)
BUN SERPL-MCNC: 39 MG/DL — HIGH (ref 7–23)
CALCIUM SERPL-MCNC: 8.1 MG/DL — LOW (ref 8.4–10.5)
CHLORIDE SERPL-SCNC: 104 MMOL/L — SIGNIFICANT CHANGE UP (ref 96–108)
CO2 SERPL-SCNC: 23 MMOL/L — SIGNIFICANT CHANGE UP (ref 22–31)
CREAT SERPL-MCNC: 2.17 MG/DL — HIGH (ref 0.5–1.3)
GLUCOSE BLDC GLUCOMTR-MCNC: 184 MG/DL — HIGH (ref 70–99)
GLUCOSE BLDC GLUCOMTR-MCNC: 218 MG/DL — HIGH (ref 70–99)
GLUCOSE BLDC GLUCOMTR-MCNC: 228 MG/DL — HIGH (ref 70–99)
GLUCOSE BLDC GLUCOMTR-MCNC: 252 MG/DL — HIGH (ref 70–99)
GLUCOSE SERPL-MCNC: 161 MG/DL — HIGH (ref 70–99)
HBA1C BLD-MCNC: 5.4 % — SIGNIFICANT CHANGE UP (ref 4–5.6)
HCT VFR BLD CALC: 27.1 % — LOW (ref 39–50)
HCT VFR BLD CALC: 28.2 % — LOW (ref 39–50)
HGB BLD-MCNC: 8.9 G/DL — LOW (ref 13–17)
HGB BLD-MCNC: 9 G/DL — LOW (ref 13–17)
MCHC RBC-ENTMCNC: 28.4 PG — SIGNIFICANT CHANGE UP (ref 27–34)
MCHC RBC-ENTMCNC: 30.1 PG — SIGNIFICANT CHANGE UP (ref 27–34)
MCHC RBC-ENTMCNC: 31.4 GM/DL — LOW (ref 32–36)
MCHC RBC-ENTMCNC: 33.3 GM/DL — SIGNIFICANT CHANGE UP (ref 32–36)
MCV RBC AUTO: 90.4 FL — SIGNIFICANT CHANGE UP (ref 80–100)
MCV RBC AUTO: 90.5 FL — SIGNIFICANT CHANGE UP (ref 80–100)
PLATELET # BLD AUTO: 162 K/UL — SIGNIFICANT CHANGE UP (ref 150–400)
PLATELET # BLD AUTO: 163 K/UL — SIGNIFICANT CHANGE UP (ref 150–400)
POTASSIUM SERPL-MCNC: 4.7 MMOL/L — SIGNIFICANT CHANGE UP (ref 3.5–5.3)
POTASSIUM SERPL-SCNC: 4.7 MMOL/L — SIGNIFICANT CHANGE UP (ref 3.5–5.3)
RBC # BLD: 3 M/UL — LOW (ref 4.2–5.8)
RBC # BLD: 3.12 M/UL — LOW (ref 4.2–5.8)
RBC # FLD: 15.2 % — HIGH (ref 10.3–14.5)
RBC # FLD: 15.6 % — HIGH (ref 10.3–14.5)
SODIUM SERPL-SCNC: 135 MMOL/L — SIGNIFICANT CHANGE UP (ref 135–145)
TROPONIN I SERPL-MCNC: 3.04 NG/ML — HIGH (ref 0.02–0.06)
TROPONIN I SERPL-MCNC: 4.32 NG/ML — HIGH (ref 0.02–0.06)
WBC # BLD: 18 K/UL — HIGH (ref 3.8–10.5)
WBC # BLD: 18.9 K/UL — HIGH (ref 3.8–10.5)
WBC # FLD AUTO: 18 K/UL — HIGH (ref 3.8–10.5)
WBC # FLD AUTO: 18.9 K/UL — HIGH (ref 3.8–10.5)

## 2018-08-26 PROCEDURE — 99233 SBSQ HOSP IP/OBS HIGH 50: CPT

## 2018-08-26 PROCEDURE — 93306 TTE W/DOPPLER COMPLETE: CPT | Mod: 26

## 2018-08-26 PROCEDURE — 71045 X-RAY EXAM CHEST 1 VIEW: CPT | Mod: 26

## 2018-08-26 PROCEDURE — 93010 ELECTROCARDIOGRAM REPORT: CPT

## 2018-08-26 RX ORDER — FUROSEMIDE 40 MG
40 TABLET ORAL ONCE
Qty: 0 | Refills: 0 | Status: COMPLETED | OUTPATIENT
Start: 2018-08-26 | End: 2018-08-26

## 2018-08-26 RX ADMIN — PANTOPRAZOLE SODIUM 40 MILLIGRAM(S): 20 TABLET, DELAYED RELEASE ORAL at 05:42

## 2018-08-26 RX ADMIN — Medication 50 MILLIGRAM(S): at 05:38

## 2018-08-26 RX ADMIN — Medication 81 MILLIGRAM(S): at 11:23

## 2018-08-26 RX ADMIN — Medication 3 MILLILITER(S): at 00:01

## 2018-08-26 RX ADMIN — Medication 650 MILLIGRAM(S): at 11:30

## 2018-08-26 RX ADMIN — Medication 3 MILLILITER(S): at 16:29

## 2018-08-26 RX ADMIN — HEPARIN SODIUM 1700 UNIT(S)/HR: 5000 INJECTION INTRAVENOUS; SUBCUTANEOUS at 21:50

## 2018-08-26 RX ADMIN — Medication 4: at 12:15

## 2018-08-26 RX ADMIN — HEPARIN SODIUM 6000 UNIT(S): 5000 INJECTION INTRAVENOUS; SUBCUTANEOUS at 05:58

## 2018-08-26 RX ADMIN — Medication 3 MILLILITER(S): at 13:10

## 2018-08-26 RX ADMIN — Medication 10 MILLIGRAM(S): at 21:33

## 2018-08-26 RX ADMIN — INSULIN GLARGINE 40 UNIT(S): 100 INJECTION, SOLUTION SUBCUTANEOUS at 08:41

## 2018-08-26 RX ADMIN — Medication 2: at 08:06

## 2018-08-26 RX ADMIN — CARVEDILOL PHOSPHATE 25 MILLIGRAM(S): 80 CAPSULE, EXTENDED RELEASE ORAL at 05:39

## 2018-08-26 RX ADMIN — Medication 40 MILLIGRAM(S): at 14:46

## 2018-08-26 RX ADMIN — HEPARIN SODIUM 6000 UNIT(S): 5000 INJECTION INTRAVENOUS; SUBCUTANEOUS at 22:01

## 2018-08-26 RX ADMIN — HEPARIN SODIUM 1200 UNIT(S)/HR: 5000 INJECTION INTRAVENOUS; SUBCUTANEOUS at 06:23

## 2018-08-26 RX ADMIN — GABAPENTIN 300 MILLIGRAM(S): 400 CAPSULE ORAL at 21:25

## 2018-08-26 RX ADMIN — Medication 3 MILLILITER(S): at 04:36

## 2018-08-26 RX ADMIN — Medication 40 MILLIGRAM(S): at 21:25

## 2018-08-26 RX ADMIN — Medication 50 MILLIGRAM(S): at 17:20

## 2018-08-26 RX ADMIN — Medication 1 MILLIGRAM(S): at 09:24

## 2018-08-26 RX ADMIN — CLOPIDOGREL BISULFATE 75 MILLIGRAM(S): 75 TABLET, FILM COATED ORAL at 11:22

## 2018-08-26 RX ADMIN — HEPARIN SODIUM 1400 UNIT(S)/HR: 5000 INJECTION INTRAVENOUS; SUBCUTANEOUS at 15:26

## 2018-08-26 RX ADMIN — Medication 4: at 17:19

## 2018-08-26 RX ADMIN — Medication 3 MILLILITER(S): at 08:05

## 2018-08-26 RX ADMIN — Medication 0.5 MILLIGRAM(S): at 21:25

## 2018-08-26 RX ADMIN — Medication 3 MILLILITER(S): at 20:32

## 2018-08-26 RX ADMIN — CARVEDILOL PHOSPHATE 25 MILLIGRAM(S): 80 CAPSULE, EXTENDED RELEASE ORAL at 17:20

## 2018-08-26 RX ADMIN — ATORVASTATIN CALCIUM 20 MILLIGRAM(S): 80 TABLET, FILM COATED ORAL at 21:25

## 2018-08-26 RX ADMIN — Medication 40 MILLIGRAM(S): at 05:39

## 2018-08-26 RX ADMIN — DULOXETINE HYDROCHLORIDE 30 MILLIGRAM(S): 30 CAPSULE, DELAYED RELEASE ORAL at 11:22

## 2018-08-26 RX ADMIN — TAMSULOSIN HYDROCHLORIDE 0.8 MILLIGRAM(S): 0.4 CAPSULE ORAL at 21:25

## 2018-08-26 RX ADMIN — Medication 650 MILLIGRAM(S): at 17:44

## 2018-08-26 RX ADMIN — Medication 40 MILLIGRAM(S): at 11:22

## 2018-08-26 NOTE — DIETITIAN INITIAL EVALUATION ADULT. - ENERGY NEEDS
Ht: 6', Weight: 257#/116kg  No  edema noted in flowsheet documentation, weight fluctuations possible due to lasix Tx  Pt noted with stage 2 pressure ulcer to left buttolks

## 2018-08-26 NOTE — PROGRESS NOTE ADULT - PROBLEM SELECTOR PLAN 10
Cardiology f/u Sandro EKG heparin Drip ,Cardiology f/u Sandro EKG heparin Drip ,Cardiology dr Jo  spoke to patient refusing CAth  will speak with daughter    for now medical management stable

## 2018-08-26 NOTE — DIETITIAN INITIAL EVALUATION ADULT. - NS AS NUTRI INTERV MEALS SNACK
Carbohydrate - modified diet/Recommend to continue Renal, consistent carb diet order; Consider adding 8oz suplena q day (to provide additional 425 calories and 10.6g protein)

## 2018-08-26 NOTE — PROGRESS NOTE ADULT - ASSESSMENT
Pt is a 73 y o M from [place] presented with weakness, shortness of breath, fever, seen in ED yesterday, negative CT chest, sent home, today with fever. Pt states he has had trouble urinating for the last few days. Pt is a 73 y o M from [place] presented with weakness, shortness of breath, fever, seen in ED yesterday, negative CT chest, sent home, today with fever. Pt states he has had trouble urinating for the last few days.    Patient with peaking troponin to 6.5 overnight.      Cardiology offered LHC but patient declined as he said he had a heart attack in the past and did fine without intervention.

## 2018-08-26 NOTE — DIETITIAN INITIAL EVALUATION ADULT. - PERTINENT LABORATORY DATA
8/25/18: albumin 2.4 (low); 8/26/18: WBC 18 (high), BUN 39 (high), Creat 2.17 (high), glucose 161 (high), eGFR 29 (low)

## 2018-08-26 NOTE — DIETITIAN INITIAL EVALUATION ADULT. - NS AS NUTRI INTERV ED CONTENT
Nutrition relationship to health/disease/Current diet order related to health issues; not skipping meals, s/s of hypoglycemia/Other (specify)

## 2018-08-26 NOTE — PROGRESS NOTE ADULT - PROBLEM SELECTOR PLAN 4
C/o SOB this am continue lasix EKG heparin Drip ,Cardiology dr Jo  spoke to patient refusing CAth  will speak with daughter    for now medical management

## 2018-08-26 NOTE — PROGRESS NOTE ADULT - PROBLEM SELECTOR PROBLEM 9
Coronary artery disease involving native coronary artery of native heart without angina pectoris Acute respiratory failure with hypoxia

## 2018-08-26 NOTE — DIETITIAN INITIAL EVALUATION ADULT. - OTHER INFO
Pt seen for consult, noted with stage 2 pressure ulcer to left buttoks. Admitted with COPD, sepsis. PMHx: HTN, T2DM, CAD, MI, CHF, CKD, HLD, CHELSEA, anxiety. NKFA, pt denied chewing/swallowing difficulties. Pt denied GI distress and reported current appetite to be "good". Nsg reported 100% consumption at breakfast.

## 2018-08-26 NOTE — PROGRESS NOTE ADULT - SUBJECTIVE AND OBJECTIVE BOX
Today feels much better, denies CP, SOB, denies difficulty voiding    Vital Signs Last 24 Hrs  T(C): 37.2 (08-26-18 @ 15:52), Max: 37.2 (08-26-18 @ 15:52)  T(F): 99 (08-26-18 @ 15:52), Max: 99 (08-26-18 @ 15:52)  HR: 68 (08-26-18 @ 16:30) (68 - 72)  BP: 118/87 (08-26-18 @ 15:52) (118/87 - 138/64)  RR: 16 (08-26-18 @ 15:52) (15 - 16)  SpO2: 93% (08-26-18 @ 16:30) (93% - 98%)    PERRLA, EOMI  Neck non tender, supple  Lungs good air entry b/l  Heart S1S2  Abdomen soft, ND  Extr + edema, stasis changes                        8.9    18.0  )-----------( 163      ( 26 Aug 2018 06:00 )             28.2     26 Aug 2018 06:00    135    |  104    |  39     ----------------------------<  161    4.7     |  23     |  2.17     Ca    8.1        26 Aug 2018 06:00    TPro  7.6    /  Alb  2.4    /  TBili  0.7    /  DBili  x      /  AST  25     /  ALT  22     /  AlkPhos  100    25 Aug 2018 08:30    LIVER FUNCTIONS - ( 25 Aug 2018 08:30 )  Alb: 2.4 g/dL / Pro: 7.6 g/dL / ALK PHOS: 100 U/L / ALT: 22 U/L DA / AST: 25 U/L / GGT: x           PT/INR - ( 25 Aug 2018 08:30 )   PT: 14.7 sec;   INR: 1.32 ratio      acetaminophen   Tablet 650 milliGRAM(s) Oral every 6 hours PRN  ALBUTerol/ipratropium for Nebulization 3 milliLiter(s) Nebulizer every 4 hours  ALPRAZolam 0.5 milliGRAM(s) Oral at bedtime  aspirin  chewable 81 milliGRAM(s) Oral daily  atorvastatin 20 milliGRAM(s) Oral at bedtime  aztreonam  IVPB 500 milliGRAM(s) IV Intermittent every 12 hours  buDESOnide   0.5 milliGRAM(s) Respule 1 milliGRAM(s) Inhalation daily  carvedilol 25 milliGRAM(s) Oral every 12 hours  clopidogrel Tablet 75 milliGRAM(s) Oral daily  dextrose 40% Gel 15 Gram(s) Oral once PRN  dextrose 5%. 1000 milliLiter(s) IV Continuous <Continuous>  dextrose 50% Injectable 12.5 Gram(s) IV Push once  dextrose 50% Injectable 25 Gram(s) IV Push once  dextrose 50% Injectable 25 Gram(s) IV Push once  DULoxetine 30 milliGRAM(s) Oral daily  gabapentin 300 milliGRAM(s) Oral at bedtime  glucagon  Injectable 1 milliGRAM(s) IntraMuscular once PRN  heparin  Infusion.  Unit(s)/Hr IV Continuous <Continuous>  heparin  Injectable 6000 Unit(s) IV Push every 6 hours PRN  insulin glargine Injectable (LANTUS) 40 Unit(s) SubCutaneous every morning  insulin lispro (HumaLOG) corrective regimen sliding scale   SubCutaneous three times a day before meals  methylPREDNISolone sodium succinate Injectable 40 milliGRAM(s) IV Push every 8 hours  metoclopramide 10 milliGRAM(s) Oral at bedtime  pantoprazole    Tablet 40 milliGRAM(s) Oral before breakfast  tamsulosin 0.8 milliGRAM(s) Oral at bedtime    A/P:    HARINDER on CKD III (Cr 1.97 1/7/16) in setting of UTI, cystitis, NSTEMI  Cr improving and is now close to baseline  No nephrotoxins  CBC, BMP in am  F/u UA, urine C and S  Abx  F/u Echo  Cards eval noted  No objection to cath from renal pov but pt is refusing at present

## 2018-08-26 NOTE — PROGRESS NOTE ADULT - PROBLEM SELECTOR PLAN 7
CPAP at night, pt states setting is 17 2/2 infection, ABG done, no hypercapnea, continue IV abx, O2 support  resolved

## 2018-08-26 NOTE — PROGRESS NOTE ADULT - SUBJECTIVE AND OBJECTIVE BOX
Patient is a 73y old  Male who presents with a chief complaint of Fever, weakness, trouble urinating  this morning complaining of SOB.       Patient seen and examined at bedside.    ALLERGIES:  Avelox (Unknown)  cefadroxil (Unknown)  levofloxacin (Unknown)  sulfa drugs (Unknown)    MEDICATIONS  (STANDING):  ALBUTerol/ipratropium for Nebulization 3 milliLiter(s) Nebulizer every 4 hours  ALPRAZolam 0.5 milliGRAM(s) Oral at bedtime  aspirin  chewable 81 milliGRAM(s) Oral daily  atorvastatin 20 milliGRAM(s) Oral at bedtime  aztreonam  IVPB 500 milliGRAM(s) IV Intermittent every 12 hours  buDESOnide   0.5 milliGRAM(s) Respule 1 milliGRAM(s) Inhalation daily  carvedilol 25 milliGRAM(s) Oral every 12 hours  clopidogrel Tablet 75 milliGRAM(s) Oral daily  dextrose 5%. 1000 milliLiter(s) (50 mL/Hr) IV Continuous <Continuous>  dextrose 50% Injectable 12.5 Gram(s) IV Push once  dextrose 50% Injectable 25 Gram(s) IV Push once  dextrose 50% Injectable 25 Gram(s) IV Push once  DULoxetine 30 milliGRAM(s) Oral daily  gabapentin 300 milliGRAM(s) Oral at bedtime  heparin  Infusion.  Unit(s)/Hr (10 mL/Hr) IV Continuous <Continuous>  insulin glargine Injectable (LANTUS) 40 Unit(s) SubCutaneous every morning  insulin lispro (HumaLOG) corrective regimen sliding scale   SubCutaneous three times a day before meals  methylPREDNISolone sodium succinate Injectable 40 milliGRAM(s) IV Push every 8 hours  metoclopramide 10 milliGRAM(s) Oral at bedtime  pantoprazole    Tablet 40 milliGRAM(s) Oral before breakfast  tamsulosin 0.8 milliGRAM(s) Oral at bedtime    MEDICATIONS  (PRN):  acetaminophen   Tablet 650 milliGRAM(s) Oral every 6 hours PRN For Temp greater than 38 C (100.4 F)  dextrose 40% Gel 15 Gram(s) Oral once PRN Blood Glucose LESS THAN 70 milliGRAM(s)/deciliter  glucagon  Injectable 1 milliGRAM(s) IntraMuscular once PRN Glucose LESS THAN 70 milligrams/deciliter  heparin  Injectable 6000 Unit(s) IV Push every 6 hours PRN For aPTT less than 40    Vital Signs Last 24 Hrs  T(F): 97.7 (25 Aug 2018 19:49), Max: 100.5 (25 Aug 2018 10:32)  HR: 68 (25 Aug 2018 20:43) (68 - 112)  BP: 128/67 (25 Aug 2018 19:49) (102/61 - 128/67)  RR: 15 (25 Aug 2018 19:49) (15 - 24)  SpO2: 98% (25 Aug 2018 21:36) (86% - 98%)  I&O's Summary    25 Aug 2018 07:01  -  26 Aug 2018 07:00  --------------------------------------------------------  IN: 82 mL / OUT: 300 mL / NET: -218 mL      PHYSICAL EXAM:  General: NAD, A/O x 2  ENT: MMM  Neck: Supple, No JVD  Lungs: bilat expt wheezing throughtout lung fields  Cardio: RRR, S1/S2, No murmurs  Abdomen: Soft, Nontender, Nondistended; Bowel sounds present  Extremities: No calf tenderness, No pitting edema chronic lower extremity venous changes bilat    LABS:                        8.9    18.0  )-----------( 163      ( 26 Aug 2018 06:00 )             28.2     08-26    135  |  104  |  39  ----------------------------<  161  4.7   |  23  |  2.17    Ca    8.1      26 Aug 2018 06:00    TPro  7.6  /  Alb  2.4  /  TBili  0.7  /  DBili  x   /  AST  25  /  ALT  22  /  AlkPhos  100  08-25    eGFR if Non African American: 29 mL/min/1.73M2 (08-26-18 @ 06:00)  eGFR if African American: 34 mL/min/1.73M2 (08-26-18 @ 06:00)    PT/INR - ( 25 Aug 2018 08:30 )   PT: 14.7 sec;   INR: 1.32 ratio         PTT - ( 26 Aug 2018 04:32 )  PTT:47.0 sec  Lactate, Blood: 2.2 mmol/L (08-25 @ 08:30)    CARDIAC MARKERS ( 26 Aug 2018 06:25 )  4.319 ng/mL / x     / x     / x     / x      CARDIAC MARKERS ( 25 Aug 2018 20:14 )  6.555 ng/mL / x     / x     / x     / x      CARDIAC MARKERS ( 25 Aug 2018 08:30 )  .875 ng/mL / x     / 132 U/L / x     / x      CARDIAC MARKERS ( 24 Aug 2018 12:00 )  <.017 ng/mL / x     / 208 U/L / x     / x                ABG - ( 25 Aug 2018 12:40 )  pH, Arterial: 7.31  pH, Blood: x     /  pCO2: 40    /  pO2: 81    / HCO3: x     / Base Excess: x     /  SaO2: 96                CAPILLARY BLOOD GLUCOSE      POCT Blood Glucose.: 162 mg/dL (25 Aug 2018 21:35)  POCT Blood Glucose.: 201 mg/dL (25 Aug 2018 18:45)  POCT Blood Glucose.: 199 mg/dL (25 Aug 2018 18:44)            RADIOLOGY & ADDITIONAL TESTS:  EXAM:  CT CHEST      PROCEDURE DATE:  08/24/2018        INTERPRETATION:  CT chest without contrast  There are no relevant prior studies for comparison  History hypoxia    There is moderate cardiomegaly. There is mild chronic appearing probably   postsurgical consolidation in the anterior right apex. There are several   small nodules in the upper lung ranging up to 1.5 cm in long axis   dimension. The left hemithorax is clear. There is no cavitation or   pleural or pericardial effusion. There is moderate gynecomastia. There   are multiple right-sided renal cysts.    IMPRESSION: Findings in the right lung consistent with previous pulmonary   resection with residual or recurrent nodularity. No acute consolidation   or effusion or central edema.      EXAM:  CT RENAL STONE KEBEDE      PROCEDURE DATE:  08/25/2018        INTERPRETATION:  .    CLINICAL INFORMATION: Anuria. Evaluate for hydronephrosis.    TECHNIQUE: Helical axial images were obtained from the domes of the   diaphragm through the pubic symphysis without the administration of IV or   oral contrast. Sagittal and coronal reformatted images were obtained from   the source data.    COMPARISON: None available.     FINDINGS: Evaluation of the heart, vascular structures, and   intra-abdominal organs is limited without the administration of IV   contrast. The heart size is enlarged. The ventricular chambers appear   slightly hypodense when compared to the interventricular septum, for   which anemia can be considered.  There are atherosclerotic calcifications   of the imaged coronary arteries, aorta, and branch vessels. The imaged   portions of the aorta are normal in caliber.    Scattered areas of linear and dependent atelectasis are notable within   the bilateral lung bases.    The liver is enlarged. Cholelithiasis is noted. The unenhanced appearance   to the biliary tree, spleen, pancreas, and right adrenal gland appear   unremarkable. There is left adrenal gland thickening.    Bilateral renal cysts are noted along with rounded hypodense foci which   are too small to actually characterize. A tiny proteinaceous versus   hemorrhagic cyst is noted off the upper pole of the right kidney. There   is no hydroureteronephrosis bilaterally. There is nonspecific bilateral   perinephric stranding. There is mild diffuse urinary bladder wall   thickening.    There are multiple scattered nonspecific subcentimeter retroperitoneal   and mesenteric lymph nodes.    There is no bowel obstruction or abdominal ascites. Gas and stool are   notable throughout the large bowel loops. A few colonic diverticula are   noted. The appendix is normal.    The prostate gland and seminal vesicles appear unremarkable. There are   small bilateral fat containing inguinal hernias.    There is diffuse osteopenia. The patient is status post L4 laminectomy.   Multilevel degenerative changes are noted within the imaged potions of   the spine.    IMPRESSION: Cystitis. Correlate with urinalysis.    No hydroureteronephrosis bilaterally.    Hepatomegaly.    Gallbladder.    Cardiomegaly.            Care Discussed with Consultants/Other Providers: Patient is a 73y old  Male who presents with a chief complaint of Fever, weakness, trouble urinating this morning complaining of SOB.       Patient seen and examined at bedside.    ALLERGIES:  Avelox (Unknown)  cefadroxil (Unknown)  levofloxacin (Unknown)  sulfa drugs (Unknown)    MEDICATIONS  (STANDING):  ALBUTerol/ipratropium for Nebulization 3 milliLiter(s) Nebulizer every 4 hours  ALPRAZolam 0.5 milliGRAM(s) Oral at bedtime  aspirin  chewable 81 milliGRAM(s) Oral daily  atorvastatin 20 milliGRAM(s) Oral at bedtime  aztreonam  IVPB 500 milliGRAM(s) IV Intermittent every 12 hours  buDESOnide   0.5 milliGRAM(s) Respule 1 milliGRAM(s) Inhalation daily  carvedilol 25 milliGRAM(s) Oral every 12 hours  clopidogrel Tablet 75 milliGRAM(s) Oral daily  dextrose 5%. 1000 milliLiter(s) (50 mL/Hr) IV Continuous <Continuous>  dextrose 50% Injectable 12.5 Gram(s) IV Push once  dextrose 50% Injectable 25 Gram(s) IV Push once  dextrose 50% Injectable 25 Gram(s) IV Push once  DULoxetine 30 milliGRAM(s) Oral daily  gabapentin 300 milliGRAM(s) Oral at bedtime  heparin  Infusion.  Unit(s)/Hr (10 mL/Hr) IV Continuous <Continuous>  insulin glargine Injectable (LANTUS) 40 Unit(s) SubCutaneous every morning  insulin lispro (HumaLOG) corrective regimen sliding scale   SubCutaneous three times a day before meals  methylPREDNISolone sodium succinate Injectable 40 milliGRAM(s) IV Push every 8 hours  metoclopramide 10 milliGRAM(s) Oral at bedtime  pantoprazole    Tablet 40 milliGRAM(s) Oral before breakfast  tamsulosin 0.8 milliGRAM(s) Oral at bedtime    MEDICATIONS  (PRN):  acetaminophen   Tablet 650 milliGRAM(s) Oral every 6 hours PRN For Temp greater than 38 C (100.4 F)  dextrose 40% Gel 15 Gram(s) Oral once PRN Blood Glucose LESS THAN 70 milliGRAM(s)/deciliter  glucagon  Injectable 1 milliGRAM(s) IntraMuscular once PRN Glucose LESS THAN 70 milligrams/deciliter  heparin  Injectable 6000 Unit(s) IV Push every 6 hours PRN For aPTT less than 40    Vital Signs Last 24 Hrs  T(F): 97.7 (25 Aug 2018 19:49), Max: 100.5 (25 Aug 2018 10:32)  HR: 68 (25 Aug 2018 20:43) (68 - 112)  BP: 128/67 (25 Aug 2018 19:49) (102/61 - 128/67)  RR: 15 (25 Aug 2018 19:49) (15 - 24)  SpO2: 98% (25 Aug 2018 21:36) (86% - 98%)  I&O's Summary    25 Aug 2018 07:01  -  26 Aug 2018 07:00  --------------------------------------------------------  IN: 82 mL / OUT: 300 mL / NET: -218 mL      PHYSICAL EXAM:  General: NAD, A/O x 2  ENT: MMM  Neck: Supple, No JVD  Lungs: bilat expt wheezing throughtout lung fields  Cardio: RRR, S1/S2, No murmurs  Abdomen: Soft, Nontender, Nondistended; Bowel sounds present  Extremities: No calf tenderness, No pitting edema chronic lower extremity venous changes bilat    LABS:                        8.9    18.0  )-----------( 163      ( 26 Aug 2018 06:00 )             28.2     08-26    135  |  104  |  39  ----------------------------<  161  4.7   |  23  |  2.17    Ca    8.1      26 Aug 2018 06:00    TPro  7.6  /  Alb  2.4  /  TBili  0.7  /  DBili  x   /  AST  25  /  ALT  22  /  AlkPhos  100  08-25    eGFR if Non African American: 29 mL/min/1.73M2 (08-26-18 @ 06:00)  eGFR if African American: 34 mL/min/1.73M2 (08-26-18 @ 06:00)    PT/INR - ( 25 Aug 2018 08:30 )   PT: 14.7 sec;   INR: 1.32 ratio         PTT - ( 26 Aug 2018 04:32 )  PTT:47.0 sec  Lactate, Blood: 2.2 mmol/L (08-25 @ 08:30)    CARDIAC MARKERS ( 26 Aug 2018 06:25 )  4.319 ng/mL / x     / x     / x     / x      CARDIAC MARKERS ( 25 Aug 2018 20:14 )  6.555 ng/mL / x     / x     / x     / x      CARDIAC MARKERS ( 25 Aug 2018 08:30 )  .875 ng/mL / x     / 132 U/L / x     / x      CARDIAC MARKERS ( 24 Aug 2018 12:00 )  <.017 ng/mL / x     / 208 U/L / x     / x                ABG - ( 25 Aug 2018 12:40 )  pH, Arterial: 7.31  pH, Blood: x     /  pCO2: 40    /  pO2: 81    / HCO3: x     / Base Excess: x     /  SaO2: 96                CAPILLARY BLOOD GLUCOSE      POCT Blood Glucose.: 162 mg/dL (25 Aug 2018 21:35)  POCT Blood Glucose.: 201 mg/dL (25 Aug 2018 18:45)  POCT Blood Glucose.: 199 mg/dL (25 Aug 2018 18:44)            RADIOLOGY & ADDITIONAL TESTS:  EXAM:  CT CHEST      PROCEDURE DATE:  08/24/2018        INTERPRETATION:  CT chest without contrast  There are no relevant prior studies for comparison  History hypoxia    There is moderate cardiomegaly. There is mild chronic appearing probably   postsurgical consolidation in the anterior right apex. There are several   small nodules in the upper lung ranging up to 1.5 cm in long axis   dimension. The left hemithorax is clear. There is no cavitation or   pleural or pericardial effusion. There is moderate gynecomastia. There   are multiple right-sided renal cysts.    IMPRESSION: Findings in the right lung consistent with previous pulmonary   resection with residual or recurrent nodularity. No acute consolidation   or effusion or central edema.      EXAM:  CT RENAL STONE KEBEDE      PROCEDURE DATE:  08/25/2018        INTERPRETATION:  .    CLINICAL INFORMATION: Anuria. Evaluate for hydronephrosis.    TECHNIQUE: Helical axial images were obtained from the domes of the   diaphragm through the pubic symphysis without the administration of IV or   oral contrast. Sagittal and coronal reformatted images were obtained from   the source data.    COMPARISON: None available.     FINDINGS: Evaluation of the heart, vascular structures, and   intra-abdominal organs is limited without the administration of IV   contrast. The heart size is enlarged. The ventricular chambers appear   slightly hypodense when compared to the interventricular septum, for   which anemia can be considered.  There are atherosclerotic calcifications   of the imaged coronary arteries, aorta, and branch vessels. The imaged   portions of the aorta are normal in caliber.    Scattered areas of linear and dependent atelectasis are notable within   the bilateral lung bases.    The liver is enlarged. Cholelithiasis is noted. The unenhanced appearance   to the biliary tree, spleen, pancreas, and right adrenal gland appear   unremarkable. There is left adrenal gland thickening.    Bilateral renal cysts are noted along with rounded hypodense foci which   are too small to actually characterize. A tiny proteinaceous versus   hemorrhagic cyst is noted off the upper pole of the right kidney. There   is no hydroureteronephrosis bilaterally. There is nonspecific bilateral   perinephric stranding. There is mild diffuse urinary bladder wall   thickening.    There are multiple scattered nonspecific subcentimeter retroperitoneal   and mesenteric lymph nodes.    There is no bowel obstruction or abdominal ascites. Gas and stool are   notable throughout the large bowel loops. A few colonic diverticula are   noted. The appendix is normal.    The prostate gland and seminal vesicles appear unremarkable. There are   small bilateral fat containing inguinal hernias.    There is diffuse osteopenia. The patient is status post L4 laminectomy.   Multilevel degenerative changes are noted within the imaged potions of   the spine.    IMPRESSION: Cystitis. Correlate with urinalysis.    No hydroureteronephrosis bilaterally.    Hepatomegaly.    Gallbladder.    Cardiomegaly.            Care Discussed with Consultants/Other Providers:

## 2018-08-26 NOTE — CONSULT NOTE ADULT - SUBJECTIVE AND OBJECTIVE BOX
Chief Complaint:   73 years old male with elevated troponin, No chest pain now.  Mild shortness of breath.    HPI:  Pt is a 73 y o M from [place] presented with weakness, shortness of breath, fever, seen in ED yesterday, negative CT chest, sent home, today with fever. Pt states he has had trouble urinating for the last few days.       REVIEW OF SYSTEMS:    NECK: No pain or stiffness  RESPIRATORY: Shortness of breath on and off    CARDIOVASCULAR: No chest pain, palpitations, dizziness  GASTROINTESTINAL: No abdominal or epigastric pain. No nausea, vomiting, or hematemesis; No diarrhea or constipation. No melena or hematochezia.  NEUROLOGICAL: No headaches, memory loss, uses walker   ENDOCRINE: No heat or cold intolerance; No hair loss  MUSCULOSKELETAL: No joint pain or swelling; No muscle, back, or extremity pain  PSYCHIATRIC: No depression, anxiety, mood swings, or difficulty sleeping  SKIN: Chronic pigmentation.     PMH:  PVD (peripheral vascular disease)  Depression  Renal insufficiency  Anxiety  Sleep apnea  Lung cancer  DM (diabetes mellitus)  GERD (gastroesophageal reflux disease)  CAD (coronary artery disease)  COPD (chronic obstructive pulmonary disease)  CHF (congestive heart failure)    PSH:   No significant past surgical history    Family History:  FAMILY HISTORY:  No pertinent family history in first degree relatives      Social History:  Smoker: Non smoker now  Alcohol:  Drugs:    Medications:  acetaminophen   Tablet 650 milliGRAM(s) Oral every 6 hours PRN  ALBUTerol/ipratropium for Nebulization 3 milliLiter(s) Nebulizer every 4 hours  ALPRAZolam 0.5 milliGRAM(s) Oral at bedtime  aspirin  chewable 81 milliGRAM(s) Oral daily  atorvastatin 20 milliGRAM(s) Oral at bedtime  aztreonam  IVPB 500 milliGRAM(s) IV Intermittent every 12 hours  buDESOnide   0.5 milliGRAM(s) Respule 1 milliGRAM(s) Inhalation daily  carvedilol 25 milliGRAM(s) Oral every 12 hours  clopidogrel Tablet 75 milliGRAM(s) Oral daily  dextrose 40% Gel 15 Gram(s) Oral once PRN  dextrose 5%. 1000 milliLiter(s) IV Continuous <Continuous>  dextrose 50% Injectable 12.5 Gram(s) IV Push once  dextrose 50% Injectable 25 Gram(s) IV Push once  dextrose 50% Injectable 25 Gram(s) IV Push once  DULoxetine 30 milliGRAM(s) Oral daily  furosemide    Tablet 40 milliGRAM(s) Oral once  gabapentin 300 milliGRAM(s) Oral at bedtime  glucagon  Injectable 1 milliGRAM(s) IntraMuscular once PRN  heparin  Infusion.  Unit(s)/Hr IV Continuous <Continuous>  heparin  Injectable 6000 Unit(s) IV Push every 6 hours PRN  insulin glargine Injectable (LANTUS) 40 Unit(s) SubCutaneous every morning  insulin lispro (HumaLOG) corrective regimen sliding scale   SubCutaneous three times a day before meals  methylPREDNISolone sodium succinate Injectable 40 milliGRAM(s) IV Push every 8 hours  metoclopramide 10 milliGRAM(s) Oral at bedtime  pantoprazole    Tablet 40 milliGRAM(s) Oral before breakfast  tamsulosin 0.8 milliGRAM(s) Oral at bedtime      Allergies:  Avelox (Unknown)  cefadroxil (Unknown)  levofloxacin (Unknown)  sulfa drugs (Unknown)      Physical Exam:  T(C): 36.5 (08-25-18 @ 19:49), Max: 38.1 (08-25-18 @ 10:32)  HR: 68 (08-26-18 @ 08:06) (68 - 112)  BP: 128/67 (08-25-18 @ 19:49) (102/61 - 128/67)  RR: 15 (08-25-18 @ 19:49) (15 - 24)  SpO2: 95% (08-26-18 @ 08:06) (86% - 98%)      GENERAL: NAD, well-groomed, well-developed  HEAD:  Atraumatic, Normocephalic  EYES: EOMI, PERRLA, conjunctiva and sclera clear  ENT: Moist mucous membranes,  NECK: Supple, No JVD, no bruits  HEART: Regular rate and rhythm; soft systolic murmur, no rubs, or gallops   CHEST/LUNG: Mild rhonchi  ABDOMEN: Soft, Nontender, Nondistended; Bowel sounds present  EXTREMITIES: Mild edema with discoloration of skin  SKIN: No rashes or lesions  NERVOUS SYSTEM:  Alert & Oriented X3, Good concentration     Cardiovascular Diagnostic Testing:  ECG:  Sinus rhythm rate 61/BPM  RBBB    Labs:                        8.9    18.0  )-----------( 163      ( 26 Aug 2018 06:00 )             28.2     08-26    135  |  104  |  39<H>  ----------------------------<  161<H>  4.7   |  23  |  2.17<H>    Ca    8.1<L>      26 Aug 2018 06:00    TPro  7.6  /  Alb  2.4<L>  /  TBili  0.7  /  DBili  x   /  AST  25  /  ALT  22  /  AlkPhos  100  08-25    PT/INR - ( 25 Aug 2018 08:30 )   PT: 14.7 sec;   INR: 1.32 ratio         PTT - ( 26 Aug 2018 04:32 )  PTT:47.0 sec  CARDIAC MARKERS ( 26 Aug 2018 06:25 )  4.319 ng/mL / x     / x     / x     / x      CARDIAC MARKERS ( 25 Aug 2018 20:14 )  6.555 ng/mL / x     / x     / x     / x      CARDIAC MARKERS ( 25 Aug 2018 08:30 )  .875 ng/mL / x     / 132 U/L / x     / x      CARDIAC MARKERS ( 24 Aug 2018 12:00 )  <.017 ng/mL / x     / 208 U/L / x     / x          Serum Pro-Brain Natriuretic Peptide: 5447 pg/mL (08-25 @ 08:30)

## 2018-08-26 NOTE — PROGRESS NOTE ADULT - PROBLEM SELECTOR PLAN 6
2/2 infection, ABG done, no hypercapnea, continue IV abx, O2 support  resolved SOB this am continue duoneb q4h and steroids

## 2018-08-27 DIAGNOSIS — F32.2 MAJOR DEPRESSIVE DISORDER, SINGLE EPISODE, SEVERE WITHOUT PSYCHOTIC FEATURES: ICD-10-CM

## 2018-08-27 LAB
ANION GAP SERPL CALC-SCNC: 8 MMOL/L — SIGNIFICANT CHANGE UP (ref 5–17)
APTT BLD: 25.7 SEC — LOW (ref 27.5–37.4)
APTT BLD: 50.9 SEC — HIGH (ref 27.5–37.4)
APTT BLD: 65.7 SEC — HIGH (ref 27.5–37.4)
BUN SERPL-MCNC: 49 MG/DL — HIGH (ref 7–23)
CALCIUM SERPL-MCNC: 8.2 MG/DL — LOW (ref 8.4–10.5)
CHLORIDE SERPL-SCNC: 102 MMOL/L — SIGNIFICANT CHANGE UP (ref 96–108)
CO2 SERPL-SCNC: 23 MMOL/L — SIGNIFICANT CHANGE UP (ref 22–31)
CREAT SERPL-MCNC: 2.17 MG/DL — HIGH (ref 0.5–1.3)
GLUCOSE BLDC GLUCOMTR-MCNC: 225 MG/DL — HIGH (ref 70–99)
GLUCOSE BLDC GLUCOMTR-MCNC: 243 MG/DL — HIGH (ref 70–99)
GLUCOSE BLDC GLUCOMTR-MCNC: 296 MG/DL — HIGH (ref 70–99)
GLUCOSE BLDC GLUCOMTR-MCNC: 316 MG/DL — HIGH (ref 70–99)
GLUCOSE SERPL-MCNC: 225 MG/DL — HIGH (ref 70–99)
HCT VFR BLD CALC: 26.8 % — LOW (ref 39–50)
HGB BLD-MCNC: 8.8 G/DL — LOW (ref 13–17)
MCHC RBC-ENTMCNC: 29.5 PG — SIGNIFICANT CHANGE UP (ref 27–34)
MCHC RBC-ENTMCNC: 32.8 GM/DL — SIGNIFICANT CHANGE UP (ref 32–36)
MCV RBC AUTO: 90 FL — SIGNIFICANT CHANGE UP (ref 80–100)
PLATELET # BLD AUTO: 176 K/UL — SIGNIFICANT CHANGE UP (ref 150–400)
POTASSIUM SERPL-MCNC: 4.5 MMOL/L — SIGNIFICANT CHANGE UP (ref 3.5–5.3)
POTASSIUM SERPL-SCNC: 4.5 MMOL/L — SIGNIFICANT CHANGE UP (ref 3.5–5.3)
RBC # BLD: 2.98 M/UL — LOW (ref 4.2–5.8)
RBC # FLD: 15.5 % — HIGH (ref 10.3–14.5)
SODIUM SERPL-SCNC: 133 MMOL/L — LOW (ref 135–145)
WBC # BLD: 13.3 K/UL — HIGH (ref 3.8–10.5)
WBC # FLD AUTO: 13.3 K/UL — HIGH (ref 3.8–10.5)

## 2018-08-27 PROCEDURE — 90792 PSYCH DIAG EVAL W/MED SRVCS: CPT

## 2018-08-27 PROCEDURE — 99233 SBSQ HOSP IP/OBS HIGH 50: CPT

## 2018-08-27 RX ORDER — DULOXETINE HYDROCHLORIDE 30 MG/1
120 CAPSULE, DELAYED RELEASE ORAL DAILY
Qty: 0 | Refills: 0 | Status: DISCONTINUED | OUTPATIENT
Start: 2018-08-28 | End: 2018-09-01

## 2018-08-27 RX ORDER — DULOXETINE HYDROCHLORIDE 30 MG/1
90 CAPSULE, DELAYED RELEASE ORAL ONCE
Qty: 0 | Refills: 0 | Status: COMPLETED | OUTPATIENT
Start: 2018-08-27 | End: 2018-08-27

## 2018-08-27 RX ADMIN — Medication 50 MILLIGRAM(S): at 17:52

## 2018-08-27 RX ADMIN — INSULIN GLARGINE 40 UNIT(S): 100 INJECTION, SOLUTION SUBCUTANEOUS at 08:14

## 2018-08-27 RX ADMIN — CARVEDILOL PHOSPHATE 25 MILLIGRAM(S): 80 CAPSULE, EXTENDED RELEASE ORAL at 05:22

## 2018-08-27 RX ADMIN — DULOXETINE HYDROCHLORIDE 90 MILLIGRAM(S): 30 CAPSULE, DELAYED RELEASE ORAL at 23:21

## 2018-08-27 RX ADMIN — Medication 81 MILLIGRAM(S): at 12:06

## 2018-08-27 RX ADMIN — Medication 3 MILLILITER(S): at 16:24

## 2018-08-27 RX ADMIN — Medication 40 MILLIGRAM(S): at 14:32

## 2018-08-27 RX ADMIN — Medication 4: at 08:13

## 2018-08-27 RX ADMIN — Medication 3 MILLILITER(S): at 08:53

## 2018-08-27 RX ADMIN — Medication 3 MILLILITER(S): at 20:49

## 2018-08-27 RX ADMIN — PANTOPRAZOLE SODIUM 40 MILLIGRAM(S): 20 TABLET, DELAYED RELEASE ORAL at 05:23

## 2018-08-27 RX ADMIN — Medication 8: at 17:20

## 2018-08-27 RX ADMIN — Medication 3 MILLILITER(S): at 13:40

## 2018-08-27 RX ADMIN — Medication 40 MILLIGRAM(S): at 21:22

## 2018-08-27 RX ADMIN — Medication 10 MILLIGRAM(S): at 21:22

## 2018-08-27 RX ADMIN — CARVEDILOL PHOSPHATE 25 MILLIGRAM(S): 80 CAPSULE, EXTENDED RELEASE ORAL at 17:30

## 2018-08-27 RX ADMIN — HEPARIN SODIUM 1900 UNIT(S)/HR: 5000 INJECTION INTRAVENOUS; SUBCUTANEOUS at 11:26

## 2018-08-27 RX ADMIN — CLOPIDOGREL BISULFATE 75 MILLIGRAM(S): 75 TABLET, FILM COATED ORAL at 14:32

## 2018-08-27 RX ADMIN — Medication 50 MILLIGRAM(S): at 05:21

## 2018-08-27 RX ADMIN — Medication 6: at 12:22

## 2018-08-27 RX ADMIN — DULOXETINE HYDROCHLORIDE 30 MILLIGRAM(S): 30 CAPSULE, DELAYED RELEASE ORAL at 12:06

## 2018-08-27 RX ADMIN — TAMSULOSIN HYDROCHLORIDE 0.8 MILLIGRAM(S): 0.4 CAPSULE ORAL at 21:22

## 2018-08-27 RX ADMIN — Medication 3 MILLILITER(S): at 00:17

## 2018-08-27 RX ADMIN — GABAPENTIN 300 MILLIGRAM(S): 400 CAPSULE ORAL at 21:22

## 2018-08-27 RX ADMIN — ATORVASTATIN CALCIUM 20 MILLIGRAM(S): 80 TABLET, FILM COATED ORAL at 21:22

## 2018-08-27 RX ADMIN — Medication 0.5 MILLIGRAM(S): at 21:22

## 2018-08-27 RX ADMIN — CARVEDILOL PHOSPHATE 25 MILLIGRAM(S): 80 CAPSULE, EXTENDED RELEASE ORAL at 19:36

## 2018-08-27 RX ADMIN — Medication 1 MILLIGRAM(S): at 09:00

## 2018-08-27 RX ADMIN — Medication 40 MILLIGRAM(S): at 05:22

## 2018-08-27 RX ADMIN — HEPARIN SODIUM 1700 UNIT(S)/HR: 5000 INJECTION INTRAVENOUS; SUBCUTANEOUS at 04:12

## 2018-08-27 RX ADMIN — Medication 3 MILLILITER(S): at 04:07

## 2018-08-27 NOTE — PROGRESS NOTE ADULT - PROBLEM SELECTOR PLAN 8
CPAP at night, pt states setting is 17 Phsych eval Psych eval  social dispo secondary to severe depression, daughter states noncompliance with medications

## 2018-08-27 NOTE — PROGRESS NOTE ADULT - SUBJECTIVE AND OBJECTIVE BOX
Patient is a 73y old  Male who presents with a chief complaint of Fever, weakness, trouble urinating       Patient seen and examined at bedside.    ALLERGIES:  Avelox (Unknown)  cefadroxil (Unknown)  levofloxacin (Unknown)  sulfa drugs (Unknown)    MEDICATIONS  (STANDING):  ALBUTerol/ipratropium for Nebulization 3 milliLiter(s) Nebulizer every 4 hours  ALPRAZolam 0.5 milliGRAM(s) Oral at bedtime  aspirin  chewable 81 milliGRAM(s) Oral daily  atorvastatin 20 milliGRAM(s) Oral at bedtime  aztreonam  IVPB 500 milliGRAM(s) IV Intermittent every 12 hours  buDESOnide   0.5 milliGRAM(s) Respule 1 milliGRAM(s) Inhalation daily  carvedilol 25 milliGRAM(s) Oral every 12 hours  clopidogrel Tablet 75 milliGRAM(s) Oral daily  dextrose 5%. 1000 milliLiter(s) (50 mL/Hr) IV Continuous <Continuous>  dextrose 50% Injectable 12.5 Gram(s) IV Push once  dextrose 50% Injectable 25 Gram(s) IV Push once  dextrose 50% Injectable 25 Gram(s) IV Push once  DULoxetine 30 milliGRAM(s) Oral daily  gabapentin 300 milliGRAM(s) Oral at bedtime  heparin  Infusion.  Unit(s)/Hr (10 mL/Hr) IV Continuous <Continuous>  insulin glargine Injectable (LANTUS) 40 Unit(s) SubCutaneous every morning  insulin lispro (HumaLOG) corrective regimen sliding scale   SubCutaneous three times a day before meals  methylPREDNISolone sodium succinate Injectable 40 milliGRAM(s) IV Push every 8 hours  metoclopramide 10 milliGRAM(s) Oral at bedtime  pantoprazole    Tablet 40 milliGRAM(s) Oral before breakfast  tamsulosin 0.8 milliGRAM(s) Oral at bedtime    MEDICATIONS  (PRN):  acetaminophen   Tablet 650 milliGRAM(s) Oral every 6 hours PRN For Temp greater than 38 C (100.4 F)  dextrose 40% Gel 15 Gram(s) Oral once PRN Blood Glucose LESS THAN 70 milliGRAM(s)/deciliter  glucagon  Injectable 1 milliGRAM(s) IntraMuscular once PRN Glucose LESS THAN 70 milligrams/deciliter  heparin  Injectable 6000 Unit(s) IV Push every 6 hours PRN For aPTT less than 40    Vital Signs Last 24 Hrs  T(F): 97.7 (27 Aug 2018 04:50), Max: 99 (26 Aug 2018 15:52)  HR: 61 (27 Aug 2018 08:54) (58 - 72)  BP: 127/60 (27 Aug 2018 04:50) (118/87 - 138/67)  RR: 15 (27 Aug 2018 04:50) (15 - 16)  SpO2: 96% (27 Aug 2018 08:54) (93% - 100%)  I&O's Summary    26 Aug 2018 07:01  -  27 Aug 2018 07:00  --------------------------------------------------------  IN: 1200 mL / OUT: 203 mL / NET: 997 mL    27 Aug 2018 07:01  -  27 Aug 2018 09:31  --------------------------------------------------------  IN: 500 mL / OUT: 1 mL / NET: 499 mL      PHYSICAL EXAM:  General: NAD, A/O x 3  ENT: MMM  Neck: Supple, No JVD  Lungs: Clear to auscultation bilaterally  Cardio: RRR, S1/S2, No murmurs  Abdomen: Soft, Nontender, Nondistended; Bowel sounds present  Extremities: No calf tenderness, No pitting edema    LABS:                        8.8    13.3  )-----------( 176      ( 27 Aug 2018 06:40 )             26.8     08-27    133  |  102  |  49  ----------------------------<  225  4.5   |  23  |  2.17    Ca    8.2      27 Aug 2018 06:40    TPro  7.6  /  Alb  2.4  /  TBili  0.7  /  DBili  x   /  AST  25  /  ALT  22  /  AlkPhos  100  08-25    eGFR if : 34 mL/min/1.73M2 (08-27-18 @ 06:40)  eGFR if Non African American: 29 mL/min/1.73M2 (08-27-18 @ 06:40)    PT/INR - ( 25 Aug 2018 08:30 )   PT: 14.7 sec;   INR: 1.32 ratio         PTT - ( 27 Aug 2018 03:45 )  PTT:65.7 sec  Lactate, Blood: 2.2 mmol/L (08-25 @ 08:30)    CARDIAC MARKERS ( 26 Aug 2018 11:01 )  3.042 ng/mL / x     / x     / x     / x      CARDIAC MARKERS ( 26 Aug 2018 06:25 )  4.319 ng/mL / x     / x     / x     / x      CARDIAC MARKERS ( 25 Aug 2018 20:14 )  6.555 ng/mL / x     / x     / x     / x      CARDIAC MARKERS ( 25 Aug 2018 08:30 )  .875 ng/mL / x     / 132 U/L / x     / x      CARDIAC MARKERS ( 24 Aug 2018 12:00 )  <.017 ng/mL / x     / 208 U/L / x     / x                ABG - ( 25 Aug 2018 12:40 )  pH, Arterial: 7.31  pH, Blood: x     /  pCO2: 40    /  pO2: 81    / HCO3: x     / Base Excess: x     /  SaO2: 96                CAPILLARY BLOOD GLUCOSE      POCT Blood Glucose.: 225 mg/dL (27 Aug 2018 07:35)  POCT Blood Glucose.: 252 mg/dL (26 Aug 2018 23:27)  POCT Blood Glucose.: 228 mg/dL (26 Aug 2018 16:44)  POCT Blood Glucose.: 218 mg/dL (26 Aug 2018 11:36)    08-26 [id number] 5.4        Culture - Blood (collected 25 Aug 2018 08:30)  Source: .Blood None  Preliminary Report (26 Aug 2018 13:01):    No growth to date.    Culture - Blood (collected 25 Aug 2018 08:30)  Source: .Blood None  Preliminary Report (26 Aug 2018 13:01):    No growth to date.      RADIOLOGY & ADDITIONAL TESTS:    Care Discussed with Consultants/Other Providers: Patient is a 73y old  Male who presents with a chief complaint of Fever, weakness, trouble urinating       Patient seen and examined at bedside.    ALLERGIES:  Avelox (Unknown)  cefadroxil (Unknown)  levofloxacin (Unknown)  sulfa drugs (Unknown)    MEDICATIONS  (STANDING):  ALBUTerol/ipratropium for Nebulization 3 milliLiter(s) Nebulizer every 4 hours  ALPRAZolam 0.5 milliGRAM(s) Oral at bedtime  aspirin  chewable 81 milliGRAM(s) Oral daily  atorvastatin 20 milliGRAM(s) Oral at bedtime  aztreonam  IVPB 500 milliGRAM(s) IV Intermittent every 12 hours  buDESOnide   0.5 milliGRAM(s) Respule 1 milliGRAM(s) Inhalation daily  carvedilol 25 milliGRAM(s) Oral every 12 hours  clopidogrel Tablet 75 milliGRAM(s) Oral daily  dextrose 5%. 1000 milliLiter(s) (50 mL/Hr) IV Continuous <Continuous>  dextrose 50% Injectable 12.5 Gram(s) IV Push once  dextrose 50% Injectable 25 Gram(s) IV Push once  dextrose 50% Injectable 25 Gram(s) IV Push once  DULoxetine 30 milliGRAM(s) Oral daily  gabapentin 300 milliGRAM(s) Oral at bedtime  heparin  Infusion.  Unit(s)/Hr (10 mL/Hr) IV Continuous <Continuous>  insulin glargine Injectable (LANTUS) 40 Unit(s) SubCutaneous every morning  insulin lispro (HumaLOG) corrective regimen sliding scale   SubCutaneous three times a day before meals  methylPREDNISolone sodium succinate Injectable 40 milliGRAM(s) IV Push every 8 hours  metoclopramide 10 milliGRAM(s) Oral at bedtime  pantoprazole    Tablet 40 milliGRAM(s) Oral before breakfast  tamsulosin 0.8 milliGRAM(s) Oral at bedtime    MEDICATIONS  (PRN):  acetaminophen   Tablet 650 milliGRAM(s) Oral every 6 hours PRN For Temp greater than 38 C (100.4 F)  dextrose 40% Gel 15 Gram(s) Oral once PRN Blood Glucose LESS THAN 70 milliGRAM(s)/deciliter  glucagon  Injectable 1 milliGRAM(s) IntraMuscular once PRN Glucose LESS THAN 70 milligrams/deciliter  heparin  Injectable 6000 Unit(s) IV Push every 6 hours PRN For aPTT less than 40    Vital Signs Last 24 Hrs  T(F): 97.7 (27 Aug 2018 04:50), Max: 99 (26 Aug 2018 15:52)  HR: 61 (27 Aug 2018 08:54) (58 - 72)  BP: 127/60 (27 Aug 2018 04:50) (118/87 - 138/67)  RR: 15 (27 Aug 2018 04:50) (15 - 16)  SpO2: 96% (27 Aug 2018 08:54) (93% - 100%)  I&O's Summary    26 Aug 2018 07:01  -  27 Aug 2018 07:00  --------------------------------------------------------  IN: 1200 mL / OUT: 203 mL / NET: 997 mL    27 Aug 2018 07:01  -  27 Aug 2018 09:31  --------------------------------------------------------  IN: 500 mL / OUT: 1 mL / NET: 499 mL      PHYSICAL EXAM:  General: NAD, A/O x 3  ENT: MMM  Neck: Supple, No JVD  Lungs: Course BS to auscultation bilaterally  Cardio: RRR, S1/S2, No murmurs  Abdomen: Soft, Nontender, Nondistended; Bowel sounds present  Extremities: No calf tenderness, No pitting edema bilateral venous stasis skin changes    LABS:                        8.8    13.3  )-----------( 176      ( 27 Aug 2018 06:40 )             26.8     08-27    133  |  102  |  49  ----------------------------<  225  4.5   |  23  |  2.17    Ca    8.2      27 Aug 2018 06:40    TPro  7.6  /  Alb  2.4  /  TBili  0.7  /  DBili  x   /  AST  25  /  ALT  22  /  AlkPhos  100  08-25    eGFR if : 34 mL/min/1.73M2 (08-27-18 @ 06:40)  eGFR if Non African American: 29 mL/min/1.73M2 (08-27-18 @ 06:40)    PT/INR - ( 25 Aug 2018 08:30 )   PT: 14.7 sec;   INR: 1.32 ratio         PTT - ( 27 Aug 2018 03:45 )  PTT:65.7 sec  Lactate, Blood: 2.2 mmol/L (08-25 @ 08:30)    CARDIAC MARKERS ( 26 Aug 2018 11:01 )  3.042 ng/mL / x     / x     / x     / x      CARDIAC MARKERS ( 26 Aug 2018 06:25 )  4.319 ng/mL / x     / x     / x     / x      CARDIAC MARKERS ( 25 Aug 2018 20:14 )  6.555 ng/mL / x     / x     / x     / x      CARDIAC MARKERS ( 25 Aug 2018 08:30 )  .875 ng/mL / x     / 132 U/L / x     / x      CARDIAC MARKERS ( 24 Aug 2018 12:00 )  <.017 ng/mL / x     / 208 U/L / x     / x                ABG - ( 25 Aug 2018 12:40 )  pH, Arterial: 7.31  pH, Blood: x     /  pCO2: 40    /  pO2: 81    / HCO3: x     / Base Excess: x     /  SaO2: 96                CAPILLARY BLOOD GLUCOSE      POCT Blood Glucose.: 225 mg/dL (27 Aug 2018 07:35)  POCT Blood Glucose.: 252 mg/dL (26 Aug 2018 23:27)  POCT Blood Glucose.: 228 mg/dL (26 Aug 2018 16:44)  POCT Blood Glucose.: 218 mg/dL (26 Aug 2018 11:36)    08-26 [id number] 5.4        Culture - Blood (collected 25 Aug 2018 08:30)  Source: .Blood None  Preliminary Report (26 Aug 2018 13:01):    No growth to date.    Culture - Blood (collected 25 Aug 2018 08:30)  Source: .Blood None  Preliminary Report (26 Aug 2018 13:01):    No growth to date.      RADIOLOGY & ADDITIONAL TESTS:    Care Discussed with Consultants/Other Providers: Patient is a 73y old  Male who presents with a chief complaint of Fever, weakness, trouble urinating.     Patient arousable but tired.     Patient seen and examined at bedside.    ALLERGIES:  Avelox (Unknown)  cefadroxil (Unknown)  levofloxacin (Unknown)  sulfa drugs (Unknown)    MEDICATIONS  (STANDING):  ALBUTerol/ipratropium for Nebulization 3 milliLiter(s) Nebulizer every 4 hours  ALPRAZolam 0.5 milliGRAM(s) Oral at bedtime  aspirin  chewable 81 milliGRAM(s) Oral daily  atorvastatin 20 milliGRAM(s) Oral at bedtime  aztreonam  IVPB 500 milliGRAM(s) IV Intermittent every 12 hours  buDESOnide   0.5 milliGRAM(s) Respule 1 milliGRAM(s) Inhalation daily  carvedilol 25 milliGRAM(s) Oral every 12 hours  clopidogrel Tablet 75 milliGRAM(s) Oral daily  dextrose 5%. 1000 milliLiter(s) (50 mL/Hr) IV Continuous <Continuous>  dextrose 50% Injectable 12.5 Gram(s) IV Push once  dextrose 50% Injectable 25 Gram(s) IV Push once  dextrose 50% Injectable 25 Gram(s) IV Push once  DULoxetine 30 milliGRAM(s) Oral daily  gabapentin 300 milliGRAM(s) Oral at bedtime  heparin  Infusion.  Unit(s)/Hr (10 mL/Hr) IV Continuous <Continuous>  insulin glargine Injectable (LANTUS) 40 Unit(s) SubCutaneous every morning  insulin lispro (HumaLOG) corrective regimen sliding scale   SubCutaneous three times a day before meals  methylPREDNISolone sodium succinate Injectable 40 milliGRAM(s) IV Push every 8 hours  metoclopramide 10 milliGRAM(s) Oral at bedtime  pantoprazole    Tablet 40 milliGRAM(s) Oral before breakfast  tamsulosin 0.8 milliGRAM(s) Oral at bedtime    MEDICATIONS  (PRN):  acetaminophen   Tablet 650 milliGRAM(s) Oral every 6 hours PRN For Temp greater than 38 C (100.4 F)  dextrose 40% Gel 15 Gram(s) Oral once PRN Blood Glucose LESS THAN 70 milliGRAM(s)/deciliter  glucagon  Injectable 1 milliGRAM(s) IntraMuscular once PRN Glucose LESS THAN 70 milligrams/deciliter  heparin  Injectable 6000 Unit(s) IV Push every 6 hours PRN For aPTT less than 40    Vital Signs Last 24 Hrs  T(F): 97.7 (27 Aug 2018 04:50), Max: 99 (26 Aug 2018 15:52)  HR: 61 (27 Aug 2018 08:54) (58 - 72)  BP: 127/60 (27 Aug 2018 04:50) (118/87 - 138/67)  RR: 15 (27 Aug 2018 04:50) (15 - 16)  SpO2: 96% (27 Aug 2018 08:54) (93% - 100%)  I&O's Summary    26 Aug 2018 07:01  -  27 Aug 2018 07:00  --------------------------------------------------------  IN: 1200 mL / OUT: 203 mL / NET: 997 mL    27 Aug 2018 07:01  -  27 Aug 2018 09:31  --------------------------------------------------------  IN: 500 mL / OUT: 1 mL / NET: 499 mL      PHYSICAL EXAM:  General: NAD, A/O x 3  ENT: MMM  Neck: Supple, No JVD  Lungs: Course BS to auscultation bilaterally  Cardio: RRR, S1/S2, No murmurs  Abdomen: Soft, Nontender, Nondistended; Bowel sounds present  Extremities: No calf tenderness, No pitting edema bilateral venous stasis skin changes    LABS:                        8.8    13.3  )-----------( 176      ( 27 Aug 2018 06:40 )             26.8     08-27    133  |  102  |  49  ----------------------------<  225  4.5   |  23  |  2.17    Ca    8.2      27 Aug 2018 06:40    TPro  7.6  /  Alb  2.4  /  TBili  0.7  /  DBili  x   /  AST  25  /  ALT  22  /  AlkPhos  100  08-25    eGFR if : 34 mL/min/1.73M2 (08-27-18 @ 06:40)  eGFR if Non African American: 29 mL/min/1.73M2 (08-27-18 @ 06:40)    PT/INR - ( 25 Aug 2018 08:30 )   PT: 14.7 sec;   INR: 1.32 ratio         PTT - ( 27 Aug 2018 03:45 )  PTT:65.7 sec  Lactate, Blood: 2.2 mmol/L (08-25 @ 08:30)    CARDIAC MARKERS ( 26 Aug 2018 11:01 )  3.042 ng/mL / x     / x     / x     / x      CARDIAC MARKERS ( 26 Aug 2018 06:25 )  4.319 ng/mL / x     / x     / x     / x      CARDIAC MARKERS ( 25 Aug 2018 20:14 )  6.555 ng/mL / x     / x     / x     / x      CARDIAC MARKERS ( 25 Aug 2018 08:30 )  .875 ng/mL / x     / 132 U/L / x     / x      CARDIAC MARKERS ( 24 Aug 2018 12:00 )  <.017 ng/mL / x     / 208 U/L / x     / x                ABG - ( 25 Aug 2018 12:40 )  pH, Arterial: 7.31  pH, Blood: x     /  pCO2: 40    /  pO2: 81    / HCO3: x     / Base Excess: x     /  SaO2: 96                CAPILLARY BLOOD GLUCOSE      POCT Blood Glucose.: 225 mg/dL (27 Aug 2018 07:35)  POCT Blood Glucose.: 252 mg/dL (26 Aug 2018 23:27)  POCT Blood Glucose.: 228 mg/dL (26 Aug 2018 16:44)  POCT Blood Glucose.: 218 mg/dL (26 Aug 2018 11:36)    08-26 [id number] 5.4        Culture - Blood (collected 25 Aug 2018 08:30)  Source: .Blood None  Preliminary Report (26 Aug 2018 13:01):    No growth to date.    Culture - Blood (collected 25 Aug 2018 08:30)  Source: .Blood None  Preliminary Report (26 Aug 2018 13:01):    No growth to date.      RADIOLOGY & ADDITIONAL TESTS:    Care Discussed with Consultants/Other Providers:

## 2018-08-27 NOTE — PROGRESS NOTE ADULT - ASSESSMENT
Pt is a 73 y o M from [place] presented with weakness, shortness of breath, fever,  Pt states he has had trouble urinating for the last few days.  ???asending aortic aneursym awaiting  echo

## 2018-08-27 NOTE — PROGRESS NOTE ADULT - PROBLEM SELECTOR PLAN 4
spoke with Cardiology dr Jo Consider stopping IV heparin since pt refusing CATH    for now medical management improved

## 2018-08-27 NOTE — CONSULT NOTE ADULT - SUBJECTIVE AND OBJECTIVE BOX
Source: Chart, patient   Reliability:   consult requested by: Dr Anton  Admitting: Trish Nichols  Attending: Jr Anton    Identifying Data: 72yo with PMH significant for Severe depression, Stage 4 chronic kidney disease,Anemia,Renal insufficiency,DM (diabetes mellitus), Essential hypertension, CHF (congestive heart failure), Non-ST elevation (NSTEMI) myocardial infarction: Non-ST elevation (NSTEMI) myocardial infarction  Coronary artery disease involving native coronary artery of native heart without angina pectoris: Coronary artery disease involving native coronary artery of native heart without angina pectoris  Acute respiratory failure with hypoxia: Acute respiratory failure with hypoxia  Obstructive sleep apnea syndrome: Obstructive sleep apnea syndrome  Acute metabolic encephalopathy: Acute metabolic encephalopathy  COPD exacerbation: COPD exacerbation  Acute on chronic diastolic congestive heart failure: Acute on chronic diastolic congestive heart failure  Severe sepsis: Severe sepsis  Demand ischemia: Demand ischemia  Urinary retention: Urinary retention  Acute cystitis without hematuria: Acute cystitis without hematuria    Chief Complaint:    History of Present Illness:  Pt is a 73 y o M from [place] presented with weakness, shortness of breath, fever, seen in ED yesterday, negative CT chest, sent home, today with fever. Pt states he has had trouble urinating for the last few days. (25 Aug 2018 13:11)    Symptoms and concerns-    Premorbid functioning/ status-    Onset-    Precipitating factors/ stressors or triggers-      Psychiatric Review of Systems:  Mood  Depression-   including  --depressed mood  --anhedonia  --neurovegetative symptoms    -Hypo/Ilsa  including  --euphoria/elation  --irritabilty  --racing thoughts  --grandiosity  --excessive enery/ hyperactivity  --increased GDA/ risk-taking/spending/promiscuity    Dangerousness-  including  -suicidality  -aggression    Sleep Disturbance-  including  -hypersomnolence  -insomnia  -decreased need for sleep    Anxiety-  including  -panic symptoms  -worries  -intrusive thoughts  -compulsions    Psychotic spectrum symptoms-  including  -delusional material, PI, or bizarre thoughts  -perceptual disturbances/ hallucnations  - thought disorganization  -negative symptoms    Neuropsychiatric Symptoms-  including  -cognitive impairment  -altered sensorium  -abnormal movements  -seizures  -pain    Addiction Issues-  including  -withdrawal symptoms  -toxidrome  -craving    Medcications:  MEDICATIONS  (STANDING):  ALBUTerol/ipratropium for Nebulization 3 milliLiter(s) Nebulizer every 4 hours  ALPRAZolam 0.5 milliGRAM(s) Oral at bedtime  aspirin  chewable 81 milliGRAM(s) Oral daily  atorvastatin 20 milliGRAM(s) Oral at bedtime  aztreonam  IVPB 500 milliGRAM(s) IV Intermittent every 12 hours  buDESOnide   0.5 milliGRAM(s) Respule 1 milliGRAM(s) Inhalation daily  carvedilol 25 milliGRAM(s) Oral every 12 hours  clopidogrel Tablet 75 milliGRAM(s) Oral daily  dextrose 5%. 1000 milliLiter(s) (50 mL/Hr) IV Continuous <Continuous>  dextrose 50% Injectable 12.5 Gram(s) IV Push once  dextrose 50% Injectable 25 Gram(s) IV Push once  dextrose 50% Injectable 25 Gram(s) IV Push once  DULoxetine 30 milliGRAM(s) Oral daily  gabapentin 300 milliGRAM(s) Oral at bedtime  heparin  Infusion.  Unit(s)/Hr (10 mL/Hr) IV Continuous <Continuous>  insulin glargine Injectable (LANTUS) 40 Unit(s) SubCutaneous every morning  insulin lispro (HumaLOG) corrective regimen sliding scale   SubCutaneous three times a day before meals  methylPREDNISolone sodium succinate Injectable 40 milliGRAM(s) IV Push every 8 hours  metoclopramide 10 milliGRAM(s) Oral at bedtime  pantoprazole    Tablet 40 milliGRAM(s) Oral before breakfast  tamsulosin 0.8 milliGRAM(s) Oral at bedtime    MEDICATIONS  (PRN):  acetaminophen   Tablet 650 milliGRAM(s) Oral every 6 hours PRN For Temp greater than 38 C (100.4 F)  dextrose 40% Gel 15 Gram(s) Oral once PRN Blood Glucose LESS THAN 70 milliGRAM(s)/deciliter  glucagon  Injectable 1 milliGRAM(s) IntraMuscular once PRN Glucose LESS THAN 70 milligrams/deciliter  heparin  Injectable 6000 Unit(s) IV Push every 6 hours PRN For aPTT less than 40      Allergies    Avelox (Unknown)  cefadroxil (Unknown)  levofloxacin (Unknown)  sulfa drugs (Unknown)    Intolerances        Past Medical and Surgical History:  PAST MEDICAL & SURGICAL HISTORY:  PVD (peripheral vascular disease)  Depression  Renal insufficiency  Anxiety  Sleep apnea  Lung cancer  DM (diabetes mellitus)  GERD (gastroesophageal reflux disease)  CAD (coronary artery disease)  COPD (chronic obstructive pulmonary disease)  CHF (congestive heart failure)  No significant past surgical history      Past Psychiatric History:        details or "N" if absent  Symptoms-  Diagnoses-  Index mental health encounter-  Hospitalizations-  -#-   -details (date/location/indication) of first (if applicable)-  -details (date/location/indication) of last (if applicable)-  -details (date/location/indication) of others (if applicable)-  Treatments (other settings/ modalities)-    Psychotropic medication trials (agent/adequacy/ effects)-    Suicidality/ Aggression-      Substance Use History:       details, or "N" if absent  Patterns of use (type or agent/route/frequency/effects)  -alcohol-  -tobacco-  -other recreational drugs including illicit use or prescription meds not taken as indicated or prescribed-  -IVDU-    Addiction-   including  -withdrawal symptoms  -blackouts  -hallucinosis  -treatments (pharmacologic, rehab, or 12-step work)    Social and Personal History:  Upbringing/ Family of Origin-    Educational/ /Vocational background-    Marriage/ children, romantic and meaningful relationships-    Hobbies/ Activities of Daily Living-       details, or "N" if absent  Legal background-  Trauma background-  Violence-    Family History:  FAMILY HISTORY:  No pertinent family history in first degree relatives          details or "N" if absent  -mental illness  -addiction    T(C): 36.5 (08-27-18 @ 04:50), Max: 37.2 (08-26-18 @ 15:52)  HR: 61 (08-27-18 @ 08:54) (58 - 72)  BP: 127/60 (08-27-18 @ 04:50) (118/87 - 138/67)  RR: 15 (08-27-18 @ 04:50) (15 - 16)  SpO2: 96% (08-27-18 @ 08:54) (93% - 100%)  Wt(kg): --  Mental Status Examination:  General  Appearance-  -hygiene/grooming-  Behavior-  -PMA/R-  -AIM-  Attitude/ Cooperativity-  -eye contact-  Speech-  -rate  -rhythm  -volume  -prosody  Thought Process-  Thought Content-  -themes/preoccupations-  -perceptual disturbance-  -delusions  -bizarre/referential/paranoid ideation-  -self-harm or aggressive thoughts-  Mood-  Affect-  -quality  -stability-  -range-  -intensity-  -congruence/appropriateness-  Cognition-  -sensorium-  -orientation-  -attention-  -recall-  Impulse Control-  -state-  -trait-  Insight/Judgment-    Studies:    Laboratory testing-                        8.8    13.3  )-----------( 176      ( 27 Aug 2018 06:40 )             26.8     08-27    133<L>  |  102  |  49<H>  ----------------------------<  225<H>  4.5   |  23  |  2.17<H>    Ca    8.2<L>      27 Aug 2018 06:40      CAPILLARY BLOOD GLUCOSE      POCT Blood Glucose.: 296 mg/dL (27 Aug 2018 11:36)        PTT - ( 27 Aug 2018 10:12 )  PTT:50.9 sec    Imaging-    Examinations-    Assessment:    Recommendations:      details, or "N" if none    Work-up-   -collateral  -studies  -consults    Medication-     Other Treatment considerations-    Guidance for team/ family management-    Guidance for patient management-    Referrals-     Hospital course Follow-up  (sign off, follow, re-consult as needed, call for clearance prior to discharge)- Source: Chart, patient   Reliability: Reliable  consult requested by: Dr Anton  Admitting: Trish Nichols  Attending: Jr Anton    Identifying Data: 74yo with PMH significant for Severe depression, Stage 4 chronic kidney disease,Anemia,Renal insufficiency,DM (diabetes mellitus), Essential hypertension, CHF (congestive heart failure), Non-ST elevation (NSTEMI) myocardial infarction: Non-ST elevation (NSTEMI) myocardial infarction  Coronary artery disease involving native coronary artery of native heart without angina pectoris: Coronary artery disease involving native coronary artery of native heart without angina pectoris  Acute respiratory failure with hypoxia: Acute respiratory failure with hypoxia  Obstructive sleep apnea syndrome: Obstructive sleep apnea syndrome  Acute metabolic encephalopathy: Acute metabolic encephalopathy  COPD exacerbation: COPD exacerbation  Acute on chronic diastolic congestive heart failure: Acute on chronic diastolic congestive heart failure  Severe sepsis: Severe sepsis  Demand ischemia: Demand ischemia  Urinary retention: Urinary retention  Acute cystitis without hematuria: Acute cystitis without hematuria    Chief Complaint: "Im fine."    History of Present Illness:  Pt is a 73 y o M from [place] presented with weakness, shortness of breath, fever, seen in ED yesterday, negative CT chest, sent home, today with fever. Pt states he has had trouble urinating for the last few days. (25 Aug 2018 13:11)    Symptoms and concerns-patient reports having some feelings of loneliness since the death of his wife in 2016.  He reports he had a supportive and loving marriage and misses it, not finding other support or friendships in the facility in which he currently resides.  He finds that his family and friends are busy with their own lives and children and finds that he has time on his hands that he used to like to do woodworking but is not able to physically do anymore.  He has 2 daughters, one living nearby and one in , with whom he sees occasionally, but misses the close relationship he had wit his wife.  He denies feeling worthless or hopeless and denies feelings of wishing he were dead also.  Reports some feelings of helplessness secondary to his medical issues but reports he has no worries or anxiety over it.  Reports eating "ok" and sleeping is fine but has less energy than he used, leading him to be less active.             Premorbid functioning/ status-presently resides in assisted living facility [place] [facility] the [facility]. Previous to living facility lived in [place] for 40 years in a private home with his wife who is  since 2016.      Psychiatric Review of Systems:  Mood: /10 with 0 worst mood and 10 best mood  Used to find pleasure in woodworking and building things from wood including furniture and fireplaces, etc.  but is no longer able to physically do.   Neurovegetative symptoms:  reports fair appetite and sleep  Dangerousness-denies history or presently feeling suicidal or homicidal  Anxiety-denies  Psychotic spectrum symptoms-no perceptual disturbances/ hallucinations, no thought disorganization or negative symptoms    MEDICATIONS  (STANDING):  ALBUTerol/ipratropium for Nebulization 3 milliLiter(s) Nebulizer every 4 hours  ALPRAZolam 0.5 milliGRAM(s) Oral at bedtime  aspirin  chewable 81 milliGRAM(s) Oral daily  atorvastatin 20 milliGRAM(s) Oral at bedtime  aztreonam  IVPB 500 milliGRAM(s) IV Intermittent every 12 hours  buDESOnide   0.5 milliGRAM(s) Respule 1 milliGRAM(s) Inhalation daily  carvedilol 25 milliGRAM(s) Oral every 12 hours  clopidogrel Tablet 75 milliGRAM(s) Oral daily  dextrose 5%. 1000 milliLiter(s) (50 mL/Hr) IV Continuous <Continuous>  dextrose 50% Injectable 12.5 Gram(s) IV Push once  dextrose 50% Injectable 25 Gram(s) IV Push once  dextrose 50% Injectable 25 Gram(s) IV Push once  DULoxetine 30 milliGRAM(s) Oral daily  gabapentin 300 milliGRAM(s) Oral at bedtime  heparin  Infusion.  Unit(s)/Hr (10 mL/Hr) IV Continuous <Continuous>  insulin glargine Injectable (LANTUS) 40 Unit(s) SubCutaneous every morning  insulin lispro (HumaLOG) corrective regimen sliding scale   SubCutaneous three times a day before meals  methylPREDNISolone sodium succinate Injectable 40 milliGRAM(s) IV Push every 8 hours  metoclopramide 10 milliGRAM(s) Oral at bedtime  pantoprazole    Tablet 40 milliGRAM(s) Oral before breakfast  tamsulosin 0.8 milliGRAM(s) Oral at bedtime    MEDICATIONS  (PRN):  acetaminophen   Tablet 650 milliGRAM(s) Oral every 6 hours PRN For Temp greater than 38 C (100.4 F)  dextrose 40% Gel 15 Gram(s) Oral once PRN Blood Glucose LESS THAN 70 milliGRAM(s)/deciliter  glucagon  Injectable 1 milliGRAM(s) IntraMuscular once PRN Glucose LESS THAN 70 milligrams/deciliter  heparin  Injectable 6000 Unit(s) IV Push every 6 hours PRN For aPTT less than 40    Allergies  Avelox (Unknown)  cefadroxil (Unknown)  levofloxacin (Unknown)  sulfa drugs (Unknown)    PAST MEDICAL & SURGICAL HISTORY:  PVD (peripheral vascular disease)  Depression  Renal insufficiency  Anxiety  Sleep apnea  Lung cancer  DM (diabetes mellitus)  GERD (gastroesophageal reflux disease)  CAD (coronary artery disease)  COPD (chronic obstructive pulmonary disease)  CHF (congestive heart failure)  No significant past surgical history    Past Psychiatric History:  denies    Social and Personal History:  in 2016, retired   Educational/ /Vocational background-college graduate  Marriage/ children, romantic and meaningful relationships-2 adult daughters one living in , one in [place]   Hobbies/ Activities of Daily Living-Woodworking    FAMILY HISTORY:  No pertinent family history in first degree relatives    T(C): 36.5 (18 @ 04:50), Max: 37.2 (18 @ 15:52)  HR: 61 (18 @ 08:54) (58 - 72)  BP: 127/60 (18 @ 04:50) (118/87 - 138/67)  RR: 15 (18 @ 04:50) (15 - 16)  SpO2: 96% (18 @ 08:54) (93% - 100%)  Wt(kg): --    Mental Status Examination:  General Appearance-white bearded male  -hygiene/grooming-good  Behavior-calm  Attitude/ Dazzdvkdmovkh-mk-pgltafnth  -eye contact-fair  Speech-normal  Thought Process-linear  Thought Content-logical   Mood: "Im fine"  -congruence/appropriateness-congruent  Cognition alert oriented x 3  Impulse Control-good  Insight/Judgment-good    Studies:    Laboratory testing-                        8.8    13.3  )-----------( 176      ( 27 Aug 2018 06:40 )             26.8     08    133<L>  |  102  |  49<H>  ----------------------------<  225<H>  4.5   |  23  |  2.17<H>    Ca    8.2<L>      27 Aug 2018 06:40    POCT Blood Glucose.: 296 mg/dL (27 Aug 2018 11:36)  PTT - ( 27 Aug 2018 10:12 )  PTT:50.9 sec      Assessment: R/O Complicated Bereavement     Recommendations:   In light of hyponatremia would refrain from SSRI at this time.  Patient denies feeling low or dysphoric but reports he still misses his wife after her passing in . May benefit from bereavement counseling services upon discharge.    Denies any suicidal ideation, intent or plan.  No suicidal history. Not deemed at increased risk at this time.    Hospital course Follow-up: will continue to follow for supportive therapy while hospitalized    May benefit from bereavement counseling support upon discharge.

## 2018-08-27 NOTE — PROGRESS NOTE ADULT - PROBLEM SELECTOR PLAN 3
improving spoke with Cardiology dr Jo Consider stopping IV heparin since pt refusing CATH    for now medical management

## 2018-08-27 NOTE — PROGRESS NOTE ADULT - SUBJECTIVE AND OBJECTIVE BOX
Resting    Vital Signs Last 24 Hrs  T(C): 36.9 (08-27-18 @ 14:06), Max: 36.9 (08-26-18 @ 20:47)  T(F): 98.4 (08-27-18 @ 14:06), Max: 98.4 (08-26-18 @ 20:47)  HR: 62 (08-27-18 @ 14:06) (58 - 72)  BP: 138/60 (08-27-18 @ 14:06) (127/60 - 138/67)  RR: 14 (08-27-18 @ 14:06) (14 - 15)  SpO2: 94% (08-27-18 @ 14:06) (93% - 100%)    Lungs good air entry b/l  Heart S1S2  Abdomen soft, ND  Extr + edema, stasis changes                                8.8    13.3  )-----------( 176      ( 27 Aug 2018 06:40 )             26.8     27 Aug 2018 06:40    133    |  102    |  49     ----------------------------<  225    4.5     |  23     |  2.17     Ca    8.2        27 Aug 2018 06:40    acetaminophen   Tablet 650 milliGRAM(s) Oral every 6 hours PRN  ALBUTerol/ipratropium for Nebulization 3 milliLiter(s) Nebulizer every 4 hours  ALPRAZolam 0.5 milliGRAM(s) Oral at bedtime  aspirin  chewable 81 milliGRAM(s) Oral daily  atorvastatin 20 milliGRAM(s) Oral at bedtime  aztreonam  IVPB 500 milliGRAM(s) IV Intermittent every 12 hours  buDESOnide   0.5 milliGRAM(s) Respule 1 milliGRAM(s) Inhalation daily  carvedilol 25 milliGRAM(s) Oral every 12 hours  clopidogrel Tablet 75 milliGRAM(s) Oral daily  dextrose 40% Gel 15 Gram(s) Oral once PRN  dextrose 5%. 1000 milliLiter(s) IV Continuous <Continuous>  dextrose 50% Injectable 12.5 Gram(s) IV Push once  dextrose 50% Injectable 25 Gram(s) IV Push once  dextrose 50% Injectable 25 Gram(s) IV Push once  DULoxetine 30 milliGRAM(s) Oral daily  gabapentin 300 milliGRAM(s) Oral at bedtime  glucagon  Injectable 1 milliGRAM(s) IntraMuscular once PRN  heparin  Injectable 6000 Unit(s) IV Push every 6 hours PRN  insulin glargine Injectable (LANTUS) 40 Unit(s) SubCutaneous every morning  insulin lispro (HumaLOG) corrective regimen sliding scale   SubCutaneous three times a day before meals  methylPREDNISolone sodium succinate Injectable 40 milliGRAM(s) IV Push every 8 hours  metoclopramide 10 milliGRAM(s) Oral at bedtime  pantoprazole    Tablet 40 milliGRAM(s) Oral before breakfast  tamsulosin 0.8 milliGRAM(s) Oral at bedtime    A/P:    HARINDER on CKD III (Cr 1.97 1/7/16) in setting of UTI, cystitis, NSTEMI  Cr improved and is now close to baseline  No nephrotoxins  CBC, BMP in am  F/u UA, urine C and S  Abx  Cards f/u appr  Pt is refusing cardiac w/up at present

## 2018-08-28 DIAGNOSIS — E11.9 TYPE 2 DIABETES MELLITUS WITHOUT COMPLICATIONS: ICD-10-CM

## 2018-08-28 LAB
ANION GAP SERPL CALC-SCNC: 5 MMOL/L — SIGNIFICANT CHANGE UP (ref 5–17)
BUN SERPL-MCNC: 50 MG/DL — HIGH (ref 7–23)
CALCIUM SERPL-MCNC: 8.1 MG/DL — LOW (ref 8.4–10.5)
CHLORIDE SERPL-SCNC: 103 MMOL/L — SIGNIFICANT CHANGE UP (ref 96–108)
CO2 SERPL-SCNC: 25 MMOL/L — SIGNIFICANT CHANGE UP (ref 22–31)
CREAT SERPL-MCNC: 1.93 MG/DL — HIGH (ref 0.5–1.3)
GLUCOSE BLDC GLUCOMTR-MCNC: 174 MG/DL — HIGH (ref 70–99)
GLUCOSE BLDC GLUCOMTR-MCNC: 193 MG/DL — HIGH (ref 70–99)
GLUCOSE BLDC GLUCOMTR-MCNC: 252 MG/DL — HIGH (ref 70–99)
GLUCOSE BLDC GLUCOMTR-MCNC: 311 MG/DL — HIGH (ref 70–99)
GLUCOSE SERPL-MCNC: 187 MG/DL — HIGH (ref 70–99)
HCT VFR BLD CALC: 27.9 % — LOW (ref 39–50)
HGB BLD-MCNC: 9 G/DL — LOW (ref 13–17)
MCHC RBC-ENTMCNC: 29 PG — SIGNIFICANT CHANGE UP (ref 27–34)
MCHC RBC-ENTMCNC: 32.2 GM/DL — SIGNIFICANT CHANGE UP (ref 32–36)
MCV RBC AUTO: 90.2 FL — SIGNIFICANT CHANGE UP (ref 80–100)
PLATELET # BLD AUTO: 195 K/UL — SIGNIFICANT CHANGE UP (ref 150–400)
POTASSIUM SERPL-MCNC: 4.2 MMOL/L — SIGNIFICANT CHANGE UP (ref 3.5–5.3)
POTASSIUM SERPL-SCNC: 4.2 MMOL/L — SIGNIFICANT CHANGE UP (ref 3.5–5.3)
RBC # BLD: 3.1 M/UL — LOW (ref 4.2–5.8)
RBC # FLD: 15.1 % — HIGH (ref 10.3–14.5)
SODIUM SERPL-SCNC: 133 MMOL/L — LOW (ref 135–145)
WBC # BLD: 11.4 K/UL — HIGH (ref 3.8–10.5)
WBC # FLD AUTO: 11.4 K/UL — HIGH (ref 3.8–10.5)

## 2018-08-28 PROCEDURE — 99233 SBSQ HOSP IP/OBS HIGH 50: CPT

## 2018-08-28 RX ORDER — IPRATROPIUM/ALBUTEROL SULFATE 18-103MCG
3 AEROSOL WITH ADAPTER (GRAM) INHALATION ONCE
Qty: 0 | Refills: 0 | Status: COMPLETED | OUTPATIENT
Start: 2018-08-28 | End: 2018-08-28

## 2018-08-28 RX ADMIN — Medication 10 MILLIGRAM(S): at 22:06

## 2018-08-28 RX ADMIN — GABAPENTIN 300 MILLIGRAM(S): 400 CAPSULE ORAL at 22:06

## 2018-08-28 RX ADMIN — Medication 8: at 11:49

## 2018-08-28 RX ADMIN — ATORVASTATIN CALCIUM 20 MILLIGRAM(S): 80 TABLET, FILM COATED ORAL at 22:06

## 2018-08-28 RX ADMIN — Medication 6: at 17:27

## 2018-08-28 RX ADMIN — PANTOPRAZOLE SODIUM 40 MILLIGRAM(S): 20 TABLET, DELAYED RELEASE ORAL at 06:11

## 2018-08-28 RX ADMIN — CARVEDILOL PHOSPHATE 25 MILLIGRAM(S): 80 CAPSULE, EXTENDED RELEASE ORAL at 05:23

## 2018-08-28 RX ADMIN — Medication 2: at 07:46

## 2018-08-28 RX ADMIN — Medication 50 MILLIGRAM(S): at 18:06

## 2018-08-28 RX ADMIN — Medication 3 MILLILITER(S): at 19:07

## 2018-08-28 RX ADMIN — Medication 0.5 MILLIGRAM(S): at 22:06

## 2018-08-28 RX ADMIN — Medication 3 MILLILITER(S): at 09:36

## 2018-08-28 RX ADMIN — Medication 650 MILLIGRAM(S): at 22:06

## 2018-08-28 RX ADMIN — Medication 50 MILLIGRAM(S): at 05:23

## 2018-08-28 RX ADMIN — DULOXETINE HYDROCHLORIDE 120 MILLIGRAM(S): 30 CAPSULE, DELAYED RELEASE ORAL at 11:57

## 2018-08-28 RX ADMIN — Medication 81 MILLIGRAM(S): at 11:56

## 2018-08-28 RX ADMIN — Medication 40 MILLIGRAM(S): at 14:50

## 2018-08-28 RX ADMIN — INSULIN GLARGINE 40 UNIT(S): 100 INJECTION, SOLUTION SUBCUTANEOUS at 07:49

## 2018-08-28 RX ADMIN — TAMSULOSIN HYDROCHLORIDE 0.8 MILLIGRAM(S): 0.4 CAPSULE ORAL at 22:06

## 2018-08-28 RX ADMIN — CLOPIDOGREL BISULFATE 75 MILLIGRAM(S): 75 TABLET, FILM COATED ORAL at 11:59

## 2018-08-28 RX ADMIN — Medication 0.5 MILLIGRAM(S): at 09:33

## 2018-08-28 RX ADMIN — Medication 40 MILLIGRAM(S): at 05:23

## 2018-08-28 RX ADMIN — Medication 3 MILLILITER(S): at 04:39

## 2018-08-28 RX ADMIN — Medication 3 MILLILITER(S): at 00:44

## 2018-08-28 RX ADMIN — CARVEDILOL PHOSPHATE 25 MILLIGRAM(S): 80 CAPSULE, EXTENDED RELEASE ORAL at 18:06

## 2018-08-28 NOTE — PROGRESS NOTE ADULT - SUBJECTIVE AND OBJECTIVE BOX
INTERVAL HPI/OVERNIGHT EVENTS:  Pt with elevated troponin, no chest pain, refusing cath.  Daughter is aware of refusal.  Scalp lesion on the head scheduled for removal in september.      REVIEW OF SYSTEMS:  CARDIOVASCULAR: No chest pain, palpitations, dizziness, or leg swelling  RESPIRATORY: No cough, wheezing, chills or hemoptysis; No shortness of breath  GASTROINTESTINAL: No abdominal or epigastric pain. No nausea, vomiting, or hematemesis; No diarrhea or constipation. No melena or hematochezia.  NEUROLOGICAL: Chronic weakness on wheel chair at assisted living  EXTREMITIES: Chronic mild lower leg edema  SKIN: Scalp lesion.    Vital Signs Last 24 Hrs  T(C): 36.8 (28 Aug 2018 12:00), Max: 37.2 (27 Aug 2018 21:48)  T(F): 98.3 (28 Aug 2018 12:00), Max: 99 (27 Aug 2018 21:48)  HR: 65 (28 Aug 2018 12:00) (60 - 65)  BP: 144/67 (28 Aug 2018 12:00) (127/45 - 150/50)  RR: 14 (28 Aug 2018 12:00) (14 - 16)  SpO2: 96% (28 Aug 2018 12:00) (94% - 98%)    PHYSICAL EXAM:  GENERAL: NAD, well-groomed, well-developed  NECK: Supple, No JVD, Normal thyroid  HEART: Regular rate and rhythm; No murmurs, rubs, or gallops  CHEST/LUNG: Clear to percussion bilaterally; No rales, rhonchi, wheezing, or rubs  ABDOMEN: Soft, Nontender, Nondistended; Bowel sounds present  NERVOUS SYSTEM:  Alert & Oriented   EXTREMITIES: Mild edema  SKIN: Scalp lesion    Care Discussed with Consultants/Other Providers [ x] YES  [ ] NO    LABS:                        9.0    11.4  )-----------( 195      ( 28 Aug 2018 05:19 )             27.9     08-28    133<L>  |  103  |  50<H>  ----------------------------<  187<H>  4.2   |  25  |  1.93<H>    Ca    8.1<L>      28 Aug 2018 05:19                RADIOLOGY & ADDITIONAL TESTS:      Imaging Personally Reviewed:  [ ] YES  [ ] NO    MEDICATIONS  (STANDING):  ALPRAZolam 0.5 milliGRAM(s) Oral at bedtime  aspirin  chewable 81 milliGRAM(s) Oral daily  atorvastatin 20 milliGRAM(s) Oral at bedtime  aztreonam  IVPB 500 milliGRAM(s) IV Intermittent every 12 hours  buDESOnide   0.5 milliGRAM(s) Respule 1 milliGRAM(s) Inhalation daily  carvedilol 25 milliGRAM(s) Oral every 12 hours  clopidogrel Tablet 75 milliGRAM(s) Oral daily  dextrose 5%. 1000 milliLiter(s) (50 mL/Hr) IV Continuous <Continuous>  dextrose 50% Injectable 12.5 Gram(s) IV Push once  dextrose 50% Injectable 25 Gram(s) IV Push once  dextrose 50% Injectable 25 Gram(s) IV Push once  DULoxetine 120 milliGRAM(s) Oral daily  gabapentin 300 milliGRAM(s) Oral at bedtime  insulin glargine Injectable (LANTUS) 40 Unit(s) SubCutaneous every morning  insulin lispro (HumaLOG) corrective regimen sliding scale   SubCutaneous three times a day before meals  metoclopramide 10 milliGRAM(s) Oral at bedtime  pantoprazole    Tablet 40 milliGRAM(s) Oral before breakfast  predniSONE   Tablet 40 milliGRAM(s) Oral daily  tamsulosin 0.8 milliGRAM(s) Oral at bedtime    MEDICATIONS  (PRN):  acetaminophen   Tablet 650 milliGRAM(s) Oral every 6 hours PRN For Temp greater than 38 C (100.4 F)  dextrose 40% Gel 15 Gram(s) Oral once PRN Blood Glucose LESS THAN 70 milliGRAM(s)/deciliter  glucagon  Injectable 1 milliGRAM(s) IntraMuscular once PRN Glucose LESS THAN 70 milligrams/deciliter  heparin  Injectable 6000 Unit(s) IV Push every 6 hours PRN For aPTT less than 40

## 2018-08-28 NOTE — PROGRESS NOTE ADULT - SUBJECTIVE AND OBJECTIVE BOX
Denies CP, c/o MUELLER    Vital Signs Last 24 Hrs  T(C): 36.8 (08-28-18 @ 12:00), Max: 37.2 (08-27-18 @ 21:48)  T(F): 98.3 (08-28-18 @ 12:00), Max: 99 (08-27-18 @ 21:48)  HR: 65 (08-28-18 @ 12:00) (60 - 65)  BP: 144/67 (08-28-18 @ 12:00) (127/45 - 150/50)  RR: 14 (08-28-18 @ 12:00) (14 - 16)  SpO2: 96% (08-28-18 @ 12:00) (95% - 98%)    Lungs decr BS b/l bases  Heart S1S2  Abdomen soft, ND, NT  Extr + edema, stasis changes                                        9.0    11.4  )-----------( 195      ( 28 Aug 2018 05:19 )             27.9     28 Aug 2018 05:19    133    |  103    |  50     ----------------------------<  187    4.2     |  25     |  1.93     Ca    8.1        28 Aug 2018 05:19    acetaminophen   Tablet 650 milliGRAM(s) Oral every 6 hours PRN  ALPRAZolam 0.5 milliGRAM(s) Oral at bedtime  aspirin  chewable 81 milliGRAM(s) Oral daily  atorvastatin 20 milliGRAM(s) Oral at bedtime  aztreonam  IVPB 500 milliGRAM(s) IV Intermittent every 12 hours  buDESOnide   0.5 milliGRAM(s) Respule 1 milliGRAM(s) Inhalation daily  carvedilol 25 milliGRAM(s) Oral every 12 hours  clopidogrel Tablet 75 milliGRAM(s) Oral daily  dextrose 40% Gel 15 Gram(s) Oral once PRN  dextrose 5%. 1000 milliLiter(s) IV Continuous <Continuous>  dextrose 50% Injectable 12.5 Gram(s) IV Push once  dextrose 50% Injectable 25 Gram(s) IV Push once  dextrose 50% Injectable 25 Gram(s) IV Push once  DULoxetine 120 milliGRAM(s) Oral daily  gabapentin 300 milliGRAM(s) Oral at bedtime  glucagon  Injectable 1 milliGRAM(s) IntraMuscular once PRN  heparin  Injectable 6000 Unit(s) IV Push every 6 hours PRN  insulin glargine Injectable (LANTUS) 40 Unit(s) SubCutaneous every morning  insulin lispro (HumaLOG) corrective regimen sliding scale   SubCutaneous three times a day before meals  metoclopramide 10 milliGRAM(s) Oral at bedtime  pantoprazole    Tablet 40 milliGRAM(s) Oral before breakfast  predniSONE   Tablet 40 milliGRAM(s) Oral daily  tamsulosin 0.8 milliGRAM(s) Oral at bedtime    A/P:    S/p HARINDER on CKD III (Cr 1.97 1/7/16) in setting of UTI, cystitis, NSTEMI  Cr has improved and is now at baseline  CBC, BMP in am  F/u UA  Avoid ACE/ARB  Can add low dose Lasix w/renal fx f/u  Further plans per Cardiology

## 2018-08-28 NOTE — PROGRESS NOTE ADULT - ASSESSMENT
72yo male from [facility]. PMH DMII, Chronic dCHF, COPD, PVD, CKD p/w SOB, Fever & Difficulty breathing. Admitted w. NSTEMI, UTI, Cystitis.

## 2018-08-28 NOTE — PROGRESS NOTE ADULT - PROBLEM SELECTOR PLAN 3
Appreciate Cards recs  Echo dilated aortic root  Pt. refusing further cardiac w/u/Cath   c/w ASA, Plavix, BB, Statin     for now medical management

## 2018-08-28 NOTE — PROGRESS NOTE ADULT - SUBJECTIVE AND OBJECTIVE BOX
Patient is a 73y old  Male who presents with a chief complaint of Fever, weakness, trouble urinating (25 Aug 2018 13:11)      Patient seen and examined at bedside.    S: Denies any complaints. Reports feeling "ok" this morning.     ALLERGIES:  Avelox (Unknown)  cefadroxil (Unknown)  levofloxacin (Unknown)  sulfa drugs (Unknown)    MEDICATIONS:  acetaminophen   Tablet 650 milliGRAM(s) Oral every 6 hours PRN  ALPRAZolam 0.5 milliGRAM(s) Oral at bedtime  aspirin  chewable 81 milliGRAM(s) Oral daily  atorvastatin 20 milliGRAM(s) Oral at bedtime  aztreonam  IVPB 500 milliGRAM(s) IV Intermittent every 12 hours  buDESOnide   0.5 milliGRAM(s) Respule 1 milliGRAM(s) Inhalation daily  carvedilol 25 milliGRAM(s) Oral every 12 hours  clopidogrel Tablet 75 milliGRAM(s) Oral daily  dextrose 40% Gel 15 Gram(s) Oral once PRN  dextrose 5%. 1000 milliLiter(s) IV Continuous <Continuous>  dextrose 50% Injectable 12.5 Gram(s) IV Push once  dextrose 50% Injectable 25 Gram(s) IV Push once  dextrose 50% Injectable 25 Gram(s) IV Push once  DULoxetine 120 milliGRAM(s) Oral daily  gabapentin 300 milliGRAM(s) Oral at bedtime  glucagon  Injectable 1 milliGRAM(s) IntraMuscular once PRN  heparin  Injectable 6000 Unit(s) IV Push every 6 hours PRN  insulin glargine Injectable (LANTUS) 40 Unit(s) SubCutaneous every morning  insulin lispro (HumaLOG) corrective regimen sliding scale   SubCutaneous three times a day before meals  metoclopramide 10 milliGRAM(s) Oral at bedtime  pantoprazole    Tablet 40 milliGRAM(s) Oral before breakfast  predniSONE   Tablet 40 milliGRAM(s) Oral daily  tamsulosin 0.8 milliGRAM(s) Oral at bedtime    Vital Signs Last 24 Hrs  T(F): 98.3 (28 Aug 2018 12:00), Max: 99 (27 Aug 2018 21:48)  HR: 65 (28 Aug 2018 12:00) (60 - 65)  BP: 144/67 (28 Aug 2018 12:00) (127/45 - 150/50)  RR: 14 (28 Aug 2018 12:00) (14 - 16)  SpO2: 96% (28 Aug 2018 12:00) (94% - 98%)  I&O's Summary    27 Aug 2018 07:01  -  28 Aug 2018 07:00  --------------------------------------------------------  IN: 1259 mL / OUT: 652 mL / NET: 607 mL    28 Aug 2018 07:01  -  28 Aug 2018 14:03  --------------------------------------------------------  IN: 1000 mL / OUT: 801 mL / NET: 199 mL        PHYSICAL EXAM:  General: NAD, A/O x 3  ENT: MMM  Lungs: Clear to auscultation bilaterally (Anteriorly)   Cardio: RR, S1/S2, No murmurs  Abdomen: Soft, NT/ND, Normal active Bowel Sounds   Extremities: No edema. Chronic Vascular changes noted     LABS:                        9.0    11.4  )-----------( 195      ( 28 Aug 2018 05:19 )             27.9     08-28    133  |  103  |  50  ----------------------------<  187  4.2   |  25  |  1.93    Ca    8.1      28 Aug 2018 05:19      eGFR if Non African American: 34 mL/min/1.73M2 (08-28-18 @ 05:19)  eGFR if African American: 39 mL/min/1.73M2 (08-28-18 @ 05:19)    PTT - ( 27 Aug 2018 18:20 )  PTT:25.7 sec    CARDIAC MARKERS ( 26 Aug 2018 11:01 )  3.042 ng/mL / x     / x     / x     / x      CARDIAC MARKERS ( 26 Aug 2018 06:25 )  4.319 ng/mL / x     / x     / x     / x      CARDIAC MARKERS ( 25 Aug 2018 20:14 )  6.555 ng/mL / x     / x     / x     / x            CAPILLARY BLOOD GLUCOSE  POCT Blood Glucose.: 311 mg/dL (28 Aug 2018 11:35)  POCT Blood Glucose.: 193 mg/dL (28 Aug 2018 07:36)  POCT Blood Glucose.: 243 mg/dL (27 Aug 2018 21:16)  POCT Blood Glucose.: 316 mg/dL (27 Aug 2018 16:29)    08-26 [id number] 5.4      Culture - Blood (collected 25 Aug 2018 08:30)  Source: .Blood None  Preliminary Report (26 Aug 2018 13:01):    No growth to date.    Culture - Blood (collected 25 Aug 2018 08:30)  Source: .Blood None  Preliminary Report (26 Aug 2018 13:01):    No growth to date.      Care Discussed with Consultants/Other Providers: Dr. Anton. Patient is a 73y old  Male who presents with a chief complaint of Fever, weakness, trouble urinating (25 Aug 2018 13:11)    Patient seen and examined at bedside.    S: Denies any complaints. Reports feeling "ok" this morning.     ALLERGIES:  Avelox (Unknown)  cefadroxil (Unknown)  levofloxacin (Unknown)  sulfa drugs (Unknown)    MEDICATIONS:  acetaminophen   Tablet 650 milliGRAM(s) Oral every 6 hours PRN  ALPRAZolam 0.5 milliGRAM(s) Oral at bedtime  aspirin  chewable 81 milliGRAM(s) Oral daily  atorvastatin 20 milliGRAM(s) Oral at bedtime  aztreonam  IVPB 500 milliGRAM(s) IV Intermittent every 12 hours  buDESOnide   0.5 milliGRAM(s) Respule 1 milliGRAM(s) Inhalation daily  carvedilol 25 milliGRAM(s) Oral every 12 hours  clopidogrel Tablet 75 milliGRAM(s) Oral daily  dextrose 40% Gel 15 Gram(s) Oral once PRN  dextrose 5%. 1000 milliLiter(s) IV Continuous <Continuous>  dextrose 50% Injectable 12.5 Gram(s) IV Push once  dextrose 50% Injectable 25 Gram(s) IV Push once  dextrose 50% Injectable 25 Gram(s) IV Push once  DULoxetine 120 milliGRAM(s) Oral daily  gabapentin 300 milliGRAM(s) Oral at bedtime  glucagon  Injectable 1 milliGRAM(s) IntraMuscular once PRN  heparin  Injectable 6000 Unit(s) IV Push every 6 hours PRN  insulin glargine Injectable (LANTUS) 40 Unit(s) SubCutaneous every morning  insulin lispro (HumaLOG) corrective regimen sliding scale   SubCutaneous three times a day before meals  metoclopramide 10 milliGRAM(s) Oral at bedtime  pantoprazole    Tablet 40 milliGRAM(s) Oral before breakfast  predniSONE   Tablet 40 milliGRAM(s) Oral daily  tamsulosin 0.8 milliGRAM(s) Oral at bedtime    Vital Signs Last 24 Hrs  T(F): 98.3 (28 Aug 2018 12:00), Max: 99 (27 Aug 2018 21:48)  HR: 65 (28 Aug 2018 12:00) (60 - 65)  BP: 144/67 (28 Aug 2018 12:00) (127/45 - 150/50)  RR: 14 (28 Aug 2018 12:00) (14 - 16)  SpO2: 96% (28 Aug 2018 12:00) (94% - 98%)  I&O's Summary    27 Aug 2018 07:01  -  28 Aug 2018 07:00  --------------------------------------------------------  IN: 1259 mL / OUT: 652 mL / NET: 607 mL    28 Aug 2018 07:01  -  28 Aug 2018 14:03  --------------------------------------------------------  IN: 1000 mL / OUT: 801 mL / NET: 199 mL        PHYSICAL EXAM:  General: NAD, A/O x 3  ENT: MMM  Lungs: Clear to auscultation bilaterally (Anteriorly)   Cardio: RR, S1/S2, No murmurs  Abdomen: Soft, NT/ND, Normal active Bowel Sounds   Extremities: No edema. Chronic Vascular changes noted     LABS:                        9.0    11.4  )-----------( 195      ( 28 Aug 2018 05:19 )             27.9     08-28    133  |  103  |  50  ----------------------------<  187  4.2   |  25  |  1.93    Ca    8.1      28 Aug 2018 05:19      eGFR if Non African American: 34 mL/min/1.73M2 (08-28-18 @ 05:19)  eGFR if African American: 39 mL/min/1.73M2 (08-28-18 @ 05:19)    PTT - ( 27 Aug 2018 18:20 )  PTT:25.7 sec    CARDIAC MARKERS ( 26 Aug 2018 11:01 )  3.042 ng/mL / x     / x     / x     / x      CARDIAC MARKERS ( 26 Aug 2018 06:25 )  4.319 ng/mL / x     / x     / x     / x      CARDIAC MARKERS ( 25 Aug 2018 20:14 )  6.555 ng/mL / x     / x     / x     / x        CAPILLARY BLOOD GLUCOSE  POCT Blood Glucose.: 311 mg/dL (28 Aug 2018 11:35)  POCT Blood Glucose.: 193 mg/dL (28 Aug 2018 07:36)  POCT Blood Glucose.: 243 mg/dL (27 Aug 2018 21:16)  POCT Blood Glucose.: 316 mg/dL (27 Aug 2018 16:29)    08-26 [id number] 5.4    Culture - Blood (collected 25 Aug 2018 08:30)  Source: .Blood None  Preliminary Report (26 Aug 2018 13:01):    No growth to date.    Culture - Blood (collected 25 Aug 2018 08:30)  Source: .Blood None  Preliminary Report (26 Aug 2018 13:01):    No growth to date.      Care Discussed with Consultants/Other Providers: Dr. Anton. Patient is a 73y old  Male who presents with a chief complaint of Fever, weakness, trouble urinating (25 Aug 2018 13:11)    Patient seen and examined at bedside.    S: Denies any complaints. Reports feeling "ok" this morning.     ALLERGIES:  Avelox (Unknown)  cefadroxil (Unknown)  levofloxacin (Unknown)  sulfa drugs (Unknown)    MEDICATIONS:  acetaminophen   Tablet 650 milliGRAM(s) Oral every 6 hours PRN  ALPRAZolam 0.5 milliGRAM(s) Oral at bedtime  aspirin  chewable 81 milliGRAM(s) Oral daily  atorvastatin 20 milliGRAM(s) Oral at bedtime  aztreonam  IVPB 500 milliGRAM(s) IV Intermittent every 12 hours  buDESOnide   0.5 milliGRAM(s) Respule 1 milliGRAM(s) Inhalation daily  carvedilol 25 milliGRAM(s) Oral every 12 hours  clopidogrel Tablet 75 milliGRAM(s) Oral daily  dextrose 40% Gel 15 Gram(s) Oral once PRN  dextrose 5%. 1000 milliLiter(s) IV Continuous <Continuous>  dextrose 50% Injectable 12.5 Gram(s) IV Push once  dextrose 50% Injectable 25 Gram(s) IV Push once  dextrose 50% Injectable 25 Gram(s) IV Push once  DULoxetine 120 milliGRAM(s) Oral daily  gabapentin 300 milliGRAM(s) Oral at bedtime  glucagon  Injectable 1 milliGRAM(s) IntraMuscular once PRN  heparin  Injectable 6000 Unit(s) IV Push every 6 hours PRN  insulin glargine Injectable (LANTUS) 40 Unit(s) SubCutaneous every morning  insulin lispro (HumaLOG) corrective regimen sliding scale   SubCutaneous three times a day before meals  metoclopramide 10 milliGRAM(s) Oral at bedtime  pantoprazole    Tablet 40 milliGRAM(s) Oral before breakfast  predniSONE   Tablet 40 milliGRAM(s) Oral daily  tamsulosin 0.8 milliGRAM(s) Oral at bedtime    Vital Signs Last 24 Hrs  T(F): 98.3 (28 Aug 2018 12:00), Max: 99 (27 Aug 2018 21:48)  HR: 65 (28 Aug 2018 12:00) (60 - 65)  BP: 144/67 (28 Aug 2018 12:00) (127/45 - 150/50)  RR: 14 (28 Aug 2018 12:00) (14 - 16)  SpO2: 96% (28 Aug 2018 12:00) (94% - 98%)  I&O's Summary    27 Aug 2018 07:01  -  28 Aug 2018 07:00  --------------------------------------------------------  IN: 1259 mL / OUT: 652 mL / NET: 607 mL    28 Aug 2018 07:01  -  28 Aug 2018 14:03  --------------------------------------------------------  IN: 1000 mL / OUT: 801 mL / NET: 199 mL        PHYSICAL EXAM:  General: NAD, A/O x 3  ENT: MMM  Lungs: Coarseness throughout   Cardio: RR, S1/S2, No murmurs  Abdomen: Soft, NT/ND, Normal active Bowel Sounds   Extremities: No edema. Chronic Vascular changes noted     LABS:                        9.0    11.4  )-----------( 195      ( 28 Aug 2018 05:19 )             27.9     08-28    133  |  103  |  50  ----------------------------<  187  4.2   |  25  |  1.93    Ca    8.1      28 Aug 2018 05:19      eGFR if Non African American: 34 mL/min/1.73M2 (08-28-18 @ 05:19)  eGFR if African American: 39 mL/min/1.73M2 (08-28-18 @ 05:19)    PTT - ( 27 Aug 2018 18:20 )  PTT:25.7 sec    CARDIAC MARKERS ( 26 Aug 2018 11:01 )  3.042 ng/mL / x     / x     / x     / x      CARDIAC MARKERS ( 26 Aug 2018 06:25 )  4.319 ng/mL / x     / x     / x     / x      CARDIAC MARKERS ( 25 Aug 2018 20:14 )  6.555 ng/mL / x     / x     / x     / x        CAPILLARY BLOOD GLUCOSE  POCT Blood Glucose.: 311 mg/dL (28 Aug 2018 11:35)  POCT Blood Glucose.: 193 mg/dL (28 Aug 2018 07:36)  POCT Blood Glucose.: 243 mg/dL (27 Aug 2018 21:16)  POCT Blood Glucose.: 316 mg/dL (27 Aug 2018 16:29)    08-26 [id number] 5.4    Culture - Blood (collected 25 Aug 2018 08:30)  Source: .Blood None  Preliminary Report (26 Aug 2018 13:01):    No growth to date.    Culture - Blood (collected 25 Aug 2018 08:30)  Source: .Blood None  Preliminary Report (26 Aug 2018 13:01):    No growth to date.      Care Discussed with Consultants/Other Providers: Dr. Anton.

## 2018-08-28 NOTE — PROGRESS NOTE ADULT - PROBLEM SELECTOR PLAN 8
Appreciate Psych recs  May benefit from bereavement counseling support upon discharge  Social dispo secondary to severe depression, daughter states noncompliance with medications

## 2018-08-28 NOTE — CHART NOTE - NSCHARTNOTEFT_GEN_A_CORE
NUTRITION FOLLOW UP    SOURCE: Patient/Medical Record    DIET: Renal, Low sodium , Consistent Carbohydrate    Patient noted with improved po intake , consuming 100% of meals as per nursing and observation. No Gi distress noted    CURRENT WEIGHT: 255.9/116.1, (+) LE edema    PERTINENT MEDS:   Pertinent Medications: MEDICATIONS  (STANDING):  ALPRAZolam 0.5 milliGRAM(s) Oral at bedtime  aspirin  chewable 81 milliGRAM(s) Oral daily  atorvastatin 20 milliGRAM(s) Oral at bedtime  aztreonam  IVPB 500 milliGRAM(s) IV Intermittent every 12 hours  buDESOnide   0.5 milliGRAM(s) Respule 1 milliGRAM(s) Inhalation daily  carvedilol 25 milliGRAM(s) Oral every 12 hours  clopidogrel Tablet 75 milliGRAM(s) Oral daily  dextrose 5%. 1000 milliLiter(s) (50 mL/Hr) IV Continuous <Continuous>  dextrose 50% Injectable 12.5 Gram(s) IV Push once  dextrose 50% Injectable 25 Gram(s) IV Push once  dextrose 50% Injectable 25 Gram(s) IV Push once  DULoxetine 120 milliGRAM(s) Oral daily  gabapentin 300 milliGRAM(s) Oral at bedtime  insulin glargine Injectable (LANTUS) 40 Unit(s) SubCutaneous every morning  insulin lispro (HumaLOG) corrective regimen sliding scale   SubCutaneous three times a day before meals  metoclopramide 10 milliGRAM(s) Oral at bedtime  pantoprazole    Tablet 40 milliGRAM(s) Oral before breakfast  predniSONE   Tablet 40 milliGRAM(s) Oral daily  tamsulosin 0.8 milliGRAM(s) Oral at bedtime    MEDICATIONS  (PRN):  acetaminophen   Tablet 650 milliGRAM(s) Oral every 6 hours PRN For Temp greater than 38 C (100.4 F)  dextrose 40% Gel 15 Gram(s) Oral once PRN Blood Glucose LESS THAN 70 milliGRAM(s)/deciliter  glucagon  Injectable 1 milliGRAM(s) IntraMuscular once PRN Glucose LESS THAN 70 milligrams/deciliter  heparin  Injectable 6000 Unit(s) IV Push every 6 hours PRN For aPTT less than 40      PERTINENT LABS:  Date: 28 Aug 2018 05:19  Hemoglobin 9.0 (L)   Hematocrit 27.9 (L)    Date: 08-28  Sodium 133<L>  Potassium 4.2  Glucose Serum 187<H>  BUN 50<H>  Creatinine 1.93(H)    ACCUCHECK  POCT Blood Glucose.: 311 mg/dL (28 Aug 2018 11:35)  POCT Blood Glucose.: 193 mg/dL (28 Aug 2018 07:36)  POCT Blood Glucose.: 243 mg/dL (27 Aug 2018 21:16)  POCT Blood Glucose.: 316 mg/dL (27 Aug 2018 16:29)      SKIN: surgical wound healing    ESTIMATED NEEDS:   [X] no change since previous assessment  [ ] recalculated:     PREVIOUS NUTRITION DIAGNOSIS: addressed    NUTRITION DIAGNOSIS is   [X] resolved, RD will follow as per nutrition department protocol.    NEW NUTRITION DIAGNOSIS: [X] not applicable    MONITORING AND EVALUATION:   Current diet order is appropriate and is well tolerated, but will monitor for any changes that may be needed    [X] PO intake [X] Tolerance to diet prescription [X] weights [X] follow up per protocol    NUTRITION RECOMMENDATIONS: NUTRITION FOLLOW UP    SOURCE: Patient/Medical Record    DIET: Renal, Low sodium , Consistent Carbohydrate    Patient noted with improved po intake , consuming 100% of meals as per nursing and observation. No GI distress noted    buDESOnide   0.5 milliGRAM(s) Respule 1 milliGRAM(s) Inhalation daily  carvedilol 25 milliGRAM(s) Oral every 12 hours  clopidogrel Tablet 75 milliGRAM(s) Oral daily  dextrose 5%. 1000 milliLiter(s) (50 mL/Hr) IV Continuous <Continuous>  dextrose 50% Injectable 12.5 Gram(s) IV Push once  dextrose 50% Injectable 25 Gram(s) IV Push once  dextrose 50% Injectable 25 Gram(s) IV Push once  DULoxetine 120 milliGRAM(s) Oral daily  gabapentin 300 milliGRAM(s) Oral at bedtime  insulin glargine Injectable (LANTUS) 40 Unit(s) SubCutaneous every morning  insulin lispro (HumaLOG) corrective regimen sliding scale   SubCutaneous three times a day before meals  metoclopramide 10 milliGRAM(s) Oral at bedtime  pantoprazole    Tablet 40 milliGRAM(s) Oral before breakfast  predniSONE   Tablet 40 milliGRAM(s) Oral daily  tamsulosin 0.8 milliGRAM(s) Oral at bedtime    MEDICATIONS  (PRN):  acetaminophen   Tablet 650 milliGRAM(s) Oral every 6 hours PRN For Temp greater than 38 C (100.4 F)  dextrose 40% Gel 15 Gram(s) Oral once PRN Blood Glucose LESS THAN 70 milliGRAM(s)/deciliter  glucagon  Injectable 1 milliGRAM(s) IntraMuscular once PRN Glucose LESS THAN 70 milligrams/deciliter  heparin  Injectable 6000 Unit(s) IV Push every 6 hours PRN For aPTT less than 40      PERTINENT LABS:  Date: 28 Aug 2018 05:19  Hemoglobin 9.0 (L)   Hematocrit 27.9 (L)    Date: 08-28  Sodium 133<L>  Potassium 4.2  Glucose Serum 187<H>  BUN 50<H>  Creatinine 1.93(H)    ACCUCHECK  POCT Blood Glucose.: 311 mg/dL (28 Aug 2018 11:35)  POCT Blood Glucose.: 193 mg/dL (28 Aug 2018 07:36)  POCT Blood Glucose.: 243 mg/dL (27 Aug 2018 21:16)  POCT Blood Glucose.: 316 mg/dL (27 Aug 2018 16:29)      SKIN:     ESTIMATED NEEDS:   [X] no change since previous assessment  [ ] recalculated:     PREVIOUS NUTRITION DIAGNOSIS: addressed    NUTRITION DIAGNOSIS is   [X] resolved, patients po intake has improved since admission. Spoke with patient regarding po supplements , however due to increase in po, not warranted.     MONITORING AND EVALUATION:   Current diet order is appropriate however spoke  with patient regarding changing diet consistency due to difficulty breathing , patient receptive will change to mechanicalsoft consistency    [X] PO intake [X] Tolerance to diet prescription [X] weights [X] follow up per protocol    NUTRITION RECOMMENDATIONS:

## 2018-08-29 LAB
ANION GAP SERPL CALC-SCNC: 7 MMOL/L — SIGNIFICANT CHANGE UP (ref 5–17)
APPEARANCE UR: CLEAR — SIGNIFICANT CHANGE UP
BACTERIA # UR AUTO: NEGATIVE /HPF — SIGNIFICANT CHANGE UP
BILIRUB UR-MCNC: NEGATIVE — SIGNIFICANT CHANGE UP
BUN SERPL-MCNC: 55 MG/DL — HIGH (ref 7–23)
CALCIUM SERPL-MCNC: 8.1 MG/DL — LOW (ref 8.4–10.5)
CHLORIDE SERPL-SCNC: 103 MMOL/L — SIGNIFICANT CHANGE UP (ref 96–108)
CO2 SERPL-SCNC: 25 MMOL/L — SIGNIFICANT CHANGE UP (ref 22–31)
COLOR SPEC: YELLOW — SIGNIFICANT CHANGE UP
CREAT SERPL-MCNC: 1.89 MG/DL — HIGH (ref 0.5–1.3)
DIFF PNL FLD: ABNORMAL
EPI CELLS # UR: SIGNIFICANT CHANGE UP
GLUCOSE BLDC GLUCOMTR-MCNC: 148 MG/DL — HIGH (ref 70–99)
GLUCOSE BLDC GLUCOMTR-MCNC: 225 MG/DL — HIGH (ref 70–99)
GLUCOSE BLDC GLUCOMTR-MCNC: 236 MG/DL — HIGH (ref 70–99)
GLUCOSE SERPL-MCNC: 148 MG/DL — HIGH (ref 70–99)
GLUCOSE UR QL: 250
HCT VFR BLD CALC: 28.3 % — LOW (ref 39–50)
HGB BLD-MCNC: 9.3 G/DL — LOW (ref 13–17)
KETONES UR-MCNC: NEGATIVE — SIGNIFICANT CHANGE UP
LEUKOCYTE ESTERASE UR-ACNC: ABNORMAL
MCHC RBC-ENTMCNC: 29.7 PG — SIGNIFICANT CHANGE UP (ref 27–34)
MCHC RBC-ENTMCNC: 33.1 GM/DL — SIGNIFICANT CHANGE UP (ref 32–36)
MCV RBC AUTO: 89.9 FL — SIGNIFICANT CHANGE UP (ref 80–100)
NITRITE UR-MCNC: NEGATIVE — SIGNIFICANT CHANGE UP
PH UR: 6 — SIGNIFICANT CHANGE UP (ref 5–8)
PLATELET # BLD AUTO: 204 K/UL — SIGNIFICANT CHANGE UP (ref 150–400)
POTASSIUM SERPL-MCNC: 4.2 MMOL/L — SIGNIFICANT CHANGE UP (ref 3.5–5.3)
POTASSIUM SERPL-SCNC: 4.2 MMOL/L — SIGNIFICANT CHANGE UP (ref 3.5–5.3)
PROT UR-MCNC: 100
RBC # BLD: 3.15 M/UL — LOW (ref 4.2–5.8)
RBC # FLD: 14.8 % — HIGH (ref 10.3–14.5)
RBC CASTS # UR COMP ASSIST: NEGATIVE /HPF — SIGNIFICANT CHANGE UP (ref 0–4)
SODIUM SERPL-SCNC: 135 MMOL/L — SIGNIFICANT CHANGE UP (ref 135–145)
SP GR SPEC: 1.01 — SIGNIFICANT CHANGE UP (ref 1.01–1.02)
UROBILINOGEN FLD QL: NEGATIVE — SIGNIFICANT CHANGE UP
WBC # BLD: 9.9 K/UL — SIGNIFICANT CHANGE UP (ref 3.8–10.5)
WBC # FLD AUTO: 9.9 K/UL — SIGNIFICANT CHANGE UP (ref 3.8–10.5)
WBC UR QL: NEGATIVE /HPF — SIGNIFICANT CHANGE UP (ref 0–5)

## 2018-08-29 PROCEDURE — 99233 SBSQ HOSP IP/OBS HIGH 50: CPT

## 2018-08-29 RX ORDER — IPRATROPIUM/ALBUTEROL SULFATE 18-103MCG
3 AEROSOL WITH ADAPTER (GRAM) INHALATION EVERY 6 HOURS
Qty: 0 | Refills: 0 | Status: DISCONTINUED | OUTPATIENT
Start: 2018-08-29 | End: 2018-09-01

## 2018-08-29 RX ADMIN — Medication 4: at 17:32

## 2018-08-29 RX ADMIN — DULOXETINE HYDROCHLORIDE 120 MILLIGRAM(S): 30 CAPSULE, DELAYED RELEASE ORAL at 13:09

## 2018-08-29 RX ADMIN — Medication 1 MILLIGRAM(S): at 09:09

## 2018-08-29 RX ADMIN — Medication 81 MILLIGRAM(S): at 13:09

## 2018-08-29 RX ADMIN — Medication 4: at 12:01

## 2018-08-29 RX ADMIN — Medication 0.5 MILLIGRAM(S): at 20:15

## 2018-08-29 RX ADMIN — ATORVASTATIN CALCIUM 20 MILLIGRAM(S): 80 TABLET, FILM COATED ORAL at 20:15

## 2018-08-29 RX ADMIN — Medication 50 MILLIGRAM(S): at 05:27

## 2018-08-29 RX ADMIN — CARVEDILOL PHOSPHATE 25 MILLIGRAM(S): 80 CAPSULE, EXTENDED RELEASE ORAL at 17:57

## 2018-08-29 RX ADMIN — GABAPENTIN 300 MILLIGRAM(S): 400 CAPSULE ORAL at 20:15

## 2018-08-29 RX ADMIN — Medication 3 MILLILITER(S): at 14:11

## 2018-08-29 RX ADMIN — INSULIN GLARGINE 40 UNIT(S): 100 INJECTION, SOLUTION SUBCUTANEOUS at 08:05

## 2018-08-29 RX ADMIN — Medication 10 MILLIGRAM(S): at 20:15

## 2018-08-29 RX ADMIN — PANTOPRAZOLE SODIUM 40 MILLIGRAM(S): 20 TABLET, DELAYED RELEASE ORAL at 06:09

## 2018-08-29 RX ADMIN — CARVEDILOL PHOSPHATE 25 MILLIGRAM(S): 80 CAPSULE, EXTENDED RELEASE ORAL at 05:27

## 2018-08-29 RX ADMIN — Medication 50 MILLIGRAM(S): at 17:56

## 2018-08-29 RX ADMIN — TAMSULOSIN HYDROCHLORIDE 0.8 MILLIGRAM(S): 0.4 CAPSULE ORAL at 20:15

## 2018-08-29 RX ADMIN — Medication 650 MILLIGRAM(S): at 05:26

## 2018-08-29 RX ADMIN — Medication 40 MILLIGRAM(S): at 05:27

## 2018-08-29 RX ADMIN — CLOPIDOGREL BISULFATE 75 MILLIGRAM(S): 75 TABLET, FILM COATED ORAL at 13:09

## 2018-08-29 NOTE — PROGRESS NOTE ADULT - PROBLEM SELECTOR PLAN 3
Appreciate Cards recs  Echo dilated aortic root  Pt. refusing further cardiac w/u/Cath   c/w ASA, Plavix, BB, Statin   Avoid ACE/ARB in s/o CKD    for now medical management

## 2018-08-29 NOTE — PROGRESS NOTE ADULT - SUBJECTIVE AND OBJECTIVE BOX
Denies CP, c/o MUELLER    Vital Signs Last 24 Hrs  T(C): 37.5 (18 @ 16:00), Max: 37.5 (18 @ 16:00)  T(F): 99.5 (18 @ 16:00), Max: 99.5 (18 @ 16:00)  HR: 68 (18 @ 16:00) (66 - 70)  BP: 145/73 (18 @ 16:00) (142/62 - 161/72)  RR: 14 (18 @ 16:00) (14 - 16)  SpO2: 97% (18 @ 16:00) (94% - 100%)    Lungs decr BS b/l bases  Heart S1S2  Abdomen soft, ND, NT  Extr + edema, stasis changes                                                9.3    9.9   )-----------( 204      ( 29 Aug 2018 06:48 )             28.3     29 Aug 2018 06:48    135    |  103    |  55     ----------------------------<  148    4.2     |  25     |  1.89     Ca    8.1        29 Aug 2018 06:48    Urinalysis Basic - ( 29 Aug 2018 17:30 )    Color: Yellow / Appearance: Clear / S.015 / pH: x  Gluc: x / Ketone: Negative  / Bili: Negative / Urobili: Negative   Blood: x / Protein: 100 / Nitrite: Negative   Leuk Esterase: Trace / RBC: Negative /HPF / WBC Negative /HPF   Sq Epi: x / Non Sq Epi: Neg.-Few / Bacteria: Negative /HPF    acetaminophen   Tablet 650 milliGRAM(s) Oral every 6 hours PRN  ALBUTerol/ipratropium for Nebulization 3 milliLiter(s) Nebulizer every 6 hours PRN  ALPRAZolam 0.5 milliGRAM(s) Oral at bedtime  aspirin  chewable 81 milliGRAM(s) Oral daily  atorvastatin 20 milliGRAM(s) Oral at bedtime  aztreonam  IVPB 500 milliGRAM(s) IV Intermittent every 12 hours  buDESOnide   0.5 milliGRAM(s) Respule 1 milliGRAM(s) Inhalation daily  carvedilol 25 milliGRAM(s) Oral every 12 hours  clopidogrel Tablet 75 milliGRAM(s) Oral daily  dextrose 40% Gel 15 Gram(s) Oral once PRN  dextrose 5%. 1000 milliLiter(s) IV Continuous <Continuous>  dextrose 50% Injectable 12.5 Gram(s) IV Push once  dextrose 50% Injectable 25 Gram(s) IV Push once  dextrose 50% Injectable 25 Gram(s) IV Push once  DULoxetine 120 milliGRAM(s) Oral daily  gabapentin 300 milliGRAM(s) Oral at bedtime  glucagon  Injectable 1 milliGRAM(s) IntraMuscular once PRN  heparin  Injectable 6000 Unit(s) IV Push every 6 hours PRN  insulin glargine Injectable (LANTUS) 40 Unit(s) SubCutaneous every morning  insulin lispro (HumaLOG) corrective regimen sliding scale   SubCutaneous three times a day before meals  metoclopramide 10 milliGRAM(s) Oral at bedtime  pantoprazole    Tablet 40 milliGRAM(s) Oral before breakfast  predniSONE   Tablet 40 milliGRAM(s) Oral daily  tamsulosin 0.8 milliGRAM(s) Oral at bedtime    A/P:    S/p HARINDER on CKD III (Cr 1.97 16) in setting of UTI, cystitis, NSTEMI  Cr has improved and is now at baseline  CBC, BMP in am  F/u UA  Avoid ACE/ARB  Can add Lasix w/renal fx f/u  Further plans per Cardiology

## 2018-08-29 NOTE — PROGRESS NOTE ADULT - PROBLEM SELECTOR PLAN 1
CT Abd: Cystitis. No hydroureteronephrosis bilaterally.  HARINDER in s/o UTI, Cystitis- Cr now at baseline  Appreciate Renal recs  Continue Aztreonam  f/u UA, UCx

## 2018-08-29 NOTE — PROGRESS NOTE ADULT - SUBJECTIVE AND OBJECTIVE BOX
Patient is a 73y old  Male who presents with a chief complaint of Fever, weakness, trouble urinating (25 Aug 2018 13:11)      Patient seen and examined at bedside.    S: Endorses some SOB this morning, No cp/palpitations.     ALLERGIES:  Avelox (Unknown)  cefadroxil (Unknown)  levofloxacin (Unknown)  sulfa drugs (Unknown)    MEDICATIONS:  acetaminophen   Tablet 650 milliGRAM(s) Oral every 6 hours PRN  ALPRAZolam 0.5 milliGRAM(s) Oral at bedtime  aspirin  chewable 81 milliGRAM(s) Oral daily  atorvastatin 20 milliGRAM(s) Oral at bedtime  aztreonam  IVPB 500 milliGRAM(s) IV Intermittent every 12 hours  buDESOnide   0.5 milliGRAM(s) Respule 1 milliGRAM(s) Inhalation daily  carvedilol 25 milliGRAM(s) Oral every 12 hours  clopidogrel Tablet 75 milliGRAM(s) Oral daily  dextrose 40% Gel 15 Gram(s) Oral once PRN  dextrose 5%. 1000 milliLiter(s) IV Continuous <Continuous>  dextrose 50% Injectable 12.5 Gram(s) IV Push once  dextrose 50% Injectable 25 Gram(s) IV Push once  dextrose 50% Injectable 25 Gram(s) IV Push once  DULoxetine 120 milliGRAM(s) Oral daily  gabapentin 300 milliGRAM(s) Oral at bedtime  glucagon  Injectable 1 milliGRAM(s) IntraMuscular once PRN  heparin  Injectable 6000 Unit(s) IV Push every 6 hours PRN  insulin glargine Injectable (LANTUS) 40 Unit(s) SubCutaneous every morning  insulin lispro (HumaLOG) corrective regimen sliding scale   SubCutaneous three times a day before meals  metoclopramide 10 milliGRAM(s) Oral at bedtime  pantoprazole    Tablet 40 milliGRAM(s) Oral before breakfast  predniSONE   Tablet 40 milliGRAM(s) Oral daily  tamsulosin 0.8 milliGRAM(s) Oral at bedtime    Vital Signs Last 24 Hrs  T(F): 98.4 (29 Aug 2018 05:17), Max: 98.4 (28 Aug 2018 15:58)  HR: 68 (29 Aug 2018 09:10) (65 - 69)  BP: 161/72 (29 Aug 2018 05:17) (139/62 - 161/72)  RR: 16 (29 Aug 2018 06:13) (14 - 16)  SpO2: 96% (29 Aug 2018 09:10) (96% - 100%)  I&O's Summary    28 Aug 2018 07:01  -  29 Aug 2018 07:00  --------------------------------------------------------  IN: 1300 mL / OUT: 1601 mL / NET: -301 mL    29 Aug 2018 07:01  -  29 Aug 2018 11:29  --------------------------------------------------------  IN: 180 mL / OUT: 0 mL / NET: 180 mL        PHYSICAL EXAM:  General: NAD, A/O x 3  Lungs: Course breath sounds throughout.   Cardio: RR, S1/S2, No murmurs  Abdomen: Soft, NT/ND, Normal active Bowel Sounds   Extremities: No edema. Chronic vasc changes.     LABS:                        9.3    9.9   )-----------( 204      ( 29 Aug 2018 06:48 )             28.3     08-29    135  |  103  |  55  ----------------------------<  148  4.2   |  25  |  1.89    Ca    8.1      29 Aug 2018 06:48      eGFR if Non African American: 35 mL/min/1.73M2 (08-29-18 @ 06:48)  eGFR if African American: 40 mL/min/1.73M2 (08-29-18 @ 06:48)    PTT - ( 27 Aug 2018 18:20 )  PTT:25.7 sec        CAPILLARY BLOOD GLUCOSE  POCT Blood Glucose.: 148 mg/dL (29 Aug 2018 07:49)  POCT Blood Glucose.: 174 mg/dL (28 Aug 2018 22:04)  POCT Blood Glucose.: 252 mg/dL (28 Aug 2018 16:35)  POCT Blood Glucose.: 311 mg/dL (28 Aug 2018 11:35)    08-26 [id number] 5.4      Culture - Blood (collected 25 Aug 2018 08:30)  Source: .Blood None  Preliminary Report (26 Aug 2018 13:01):    No growth to date.    Culture - Blood (collected 25 Aug 2018 08:30)  Source: .Blood None  Preliminary Report (26 Aug 2018 13:01):    No growth to date.        Care Discussed with Consultants/Other Providers: Dr. Anton.

## 2018-08-30 LAB
ANION GAP SERPL CALC-SCNC: 9 MMOL/L — SIGNIFICANT CHANGE UP (ref 5–17)
BUN SERPL-MCNC: 54 MG/DL — HIGH (ref 7–23)
CALCIUM SERPL-MCNC: 8.4 MG/DL — SIGNIFICANT CHANGE UP (ref 8.4–10.5)
CHLORIDE SERPL-SCNC: 108 MMOL/L — SIGNIFICANT CHANGE UP (ref 96–108)
CO2 SERPL-SCNC: 24 MMOL/L — SIGNIFICANT CHANGE UP (ref 22–31)
CREAT SERPL-MCNC: 1.83 MG/DL — HIGH (ref 0.5–1.3)
CULTURE RESULTS: NO GROWTH — SIGNIFICANT CHANGE UP
CULTURE RESULTS: SIGNIFICANT CHANGE UP
CULTURE RESULTS: SIGNIFICANT CHANGE UP
GLUCOSE BLDC GLUCOMTR-MCNC: 211 MG/DL — HIGH (ref 70–99)
GLUCOSE BLDC GLUCOMTR-MCNC: 303 MG/DL — HIGH (ref 70–99)
GLUCOSE BLDC GLUCOMTR-MCNC: 96 MG/DL — SIGNIFICANT CHANGE UP (ref 70–99)
GLUCOSE SERPL-MCNC: 88 MG/DL — SIGNIFICANT CHANGE UP (ref 70–99)
HCT VFR BLD CALC: 30 % — LOW (ref 39–50)
HGB BLD-MCNC: 9.8 G/DL — LOW (ref 13–17)
MCHC RBC-ENTMCNC: 29.3 PG — SIGNIFICANT CHANGE UP (ref 27–34)
MCHC RBC-ENTMCNC: 32.8 GM/DL — SIGNIFICANT CHANGE UP (ref 32–36)
MCV RBC AUTO: 89.4 FL — SIGNIFICANT CHANGE UP (ref 80–100)
PLATELET # BLD AUTO: 196 K/UL — SIGNIFICANT CHANGE UP (ref 150–400)
POTASSIUM SERPL-MCNC: 4 MMOL/L — SIGNIFICANT CHANGE UP (ref 3.5–5.3)
POTASSIUM SERPL-SCNC: 4 MMOL/L — SIGNIFICANT CHANGE UP (ref 3.5–5.3)
RBC # BLD: 3.36 M/UL — LOW (ref 4.2–5.8)
RBC # FLD: 14.8 % — HIGH (ref 10.3–14.5)
SODIUM SERPL-SCNC: 141 MMOL/L — SIGNIFICANT CHANGE UP (ref 135–145)
SPECIMEN SOURCE: SIGNIFICANT CHANGE UP
WBC # BLD: 8.6 K/UL — SIGNIFICANT CHANGE UP (ref 3.8–10.5)
WBC # FLD AUTO: 8.6 K/UL — SIGNIFICANT CHANGE UP (ref 3.8–10.5)

## 2018-08-30 PROCEDURE — 99233 SBSQ HOSP IP/OBS HIGH 50: CPT

## 2018-08-30 RX ORDER — ETHACRYNIC ACID 25 MG/1
25 TABLET ORAL DAILY
Qty: 0 | Refills: 0 | Status: DISCONTINUED | OUTPATIENT
Start: 2018-08-30 | End: 2018-09-01

## 2018-08-30 RX ADMIN — Medication 50 MILLIGRAM(S): at 17:15

## 2018-08-30 RX ADMIN — DULOXETINE HYDROCHLORIDE 120 MILLIGRAM(S): 30 CAPSULE, DELAYED RELEASE ORAL at 11:33

## 2018-08-30 RX ADMIN — GABAPENTIN 300 MILLIGRAM(S): 400 CAPSULE ORAL at 21:42

## 2018-08-30 RX ADMIN — CARVEDILOL PHOSPHATE 25 MILLIGRAM(S): 80 CAPSULE, EXTENDED RELEASE ORAL at 06:30

## 2018-08-30 RX ADMIN — Medication 0.5 MILLIGRAM(S): at 09:41

## 2018-08-30 RX ADMIN — Medication 40 MILLIGRAM(S): at 06:31

## 2018-08-30 RX ADMIN — ATORVASTATIN CALCIUM 20 MILLIGRAM(S): 80 TABLET, FILM COATED ORAL at 21:42

## 2018-08-30 RX ADMIN — INSULIN GLARGINE 40 UNIT(S): 100 INJECTION, SOLUTION SUBCUTANEOUS at 08:09

## 2018-08-30 RX ADMIN — TAMSULOSIN HYDROCHLORIDE 0.8 MILLIGRAM(S): 0.4 CAPSULE ORAL at 21:42

## 2018-08-30 RX ADMIN — Medication 4: at 11:33

## 2018-08-30 RX ADMIN — Medication 50 MILLIGRAM(S): at 06:32

## 2018-08-30 RX ADMIN — Medication 81 MILLIGRAM(S): at 11:33

## 2018-08-30 RX ADMIN — ETHACRYNIC ACID 25 MILLIGRAM(S): 25 TABLET ORAL at 17:15

## 2018-08-30 RX ADMIN — CLOPIDOGREL BISULFATE 75 MILLIGRAM(S): 75 TABLET, FILM COATED ORAL at 11:33

## 2018-08-30 RX ADMIN — Medication 0.5 MILLIGRAM(S): at 21:41

## 2018-08-30 RX ADMIN — CARVEDILOL PHOSPHATE 25 MILLIGRAM(S): 80 CAPSULE, EXTENDED RELEASE ORAL at 17:15

## 2018-08-30 RX ADMIN — PANTOPRAZOLE SODIUM 40 MILLIGRAM(S): 20 TABLET, DELAYED RELEASE ORAL at 06:30

## 2018-08-30 RX ADMIN — Medication 10 MILLIGRAM(S): at 21:42

## 2018-08-30 RX ADMIN — Medication 8: at 17:15

## 2018-08-30 NOTE — PROGRESS NOTE ADULT - ASSESSMENT
72yo male from [place] w. PMH DMII, Chronic dCHF, COPD, PVD, CKD p/w SOB, Fever & Difficulty breathing. Admitted w. NSTEMI, HARINDER, UTI, Cystitis. Pending Hospice eval for possible return to [place] with hospice following.

## 2018-08-30 NOTE — PROGRESS NOTE ADULT - SUBJECTIVE AND OBJECTIVE BOX
Denies CP, c/o MUELLER    Vital Signs Last 24 Hrs  T(C): 36.8 (18 @ 19:43), Max: 37 (18 @ 11:31)  T(F): 98.2 (18 @ 19:43), Max: 98.6 (18 @ 11:31)  HR: 74 (18 @ 19:43) (56 - 78)  BP: 122/56 (18 @ 19:43) (122/56 - 144/74)  RR: 16 (18 @ 19:43) (16 - 16)  SpO2: 96% (18 @ 19:43) (96% - 100%)    Lungs decr BS b/l bases  Heart S1S2  Abdomen soft, ND, NT  Extr + edema, stasis changes                                                      9.8    8.6   )-----------( 196      ( 30 Aug 2018 06:53 )             30.0     30 Aug 2018 06:53    141    |  108    |  54     ----------------------------<  88     4.0     |  24     |  1.83     Ca    8.4        30 Aug 2018 06:53    Urinalysis Basic - ( 29 Aug 2018 17:30 )    Color: Yellow / Appearance: Clear / S.015 / pH: x  Gluc: x / Ketone: Negative  / Bili: Negative / Urobili: Negative   Blood: x / Protein: 100 / Nitrite: Negative   Leuk Esterase: Trace / RBC: Negative /HPF / WBC Negative /HPF   Sq Epi: x / Non Sq Epi: Neg.-Few / Bacteria: Negative /HPF    acetaminophen   Tablet 650 milliGRAM(s) Oral every 6 hours PRN  ALBUTerol/ipratropium for Nebulization 3 milliLiter(s) Nebulizer every 6 hours PRN  ALPRAZolam 0.5 milliGRAM(s) Oral at bedtime  aspirin  chewable 81 milliGRAM(s) Oral daily  atorvastatin 20 milliGRAM(s) Oral at bedtime  aztreonam  IVPB 500 milliGRAM(s) IV Intermittent every 12 hours  buDESOnide   0.5 milliGRAM(s) Respule 1 milliGRAM(s) Inhalation daily  carvedilol 25 milliGRAM(s) Oral every 12 hours  clopidogrel Tablet 75 milliGRAM(s) Oral daily  dextrose 40% Gel 15 Gram(s) Oral once PRN  dextrose 5%. 1000 milliLiter(s) IV Continuous <Continuous>  dextrose 50% Injectable 12.5 Gram(s) IV Push once  dextrose 50% Injectable 25 Gram(s) IV Push once  dextrose 50% Injectable 25 Gram(s) IV Push once  DULoxetine 120 milliGRAM(s) Oral daily  ethacrynic acid 25 milliGRAM(s) Oral daily  gabapentin 300 milliGRAM(s) Oral at bedtime  glucagon  Injectable 1 milliGRAM(s) IntraMuscular once PRN  heparin  Injectable 6000 Unit(s) IV Push every 6 hours PRN  insulin glargine Injectable (LANTUS) 40 Unit(s) SubCutaneous every morning  insulin lispro (HumaLOG) corrective regimen sliding scale   SubCutaneous three times a day before meals  metoclopramide 10 milliGRAM(s) Oral at bedtime  pantoprazole    Tablet 40 milliGRAM(s) Oral before breakfast  predniSONE   Tablet 40 milliGRAM(s) Oral daily  tamsulosin 0.8 milliGRAM(s) Oral at bedtime    A/P:    S/p HARINDER on CKD III (Cr 1.97 16) in setting of UTI, cystitis, NSTEMI  Cr has improved and is now at baseline  CBC, BMP in am  F/u UA  Avoid ACE/ARB  Trial of diuretic w/renal fx f/u  Further plans per Cardiology

## 2018-08-30 NOTE — PROGRESS NOTE ADULT - SUBJECTIVE AND OBJECTIVE BOX
Patient is a 73y old  Male who presents with a chief complaint of Fever, weakness, trouble urinating (25 Aug 2018 13:11)      Patient seen and examined at bedside.    ALLERGIES:  Avelox (Unknown)  cefadroxil (Unknown)  levofloxacin (Unknown)  sulfa drugs (Unknown)    MEDICATIONS  (STANDING):  ALPRAZolam 0.5 milliGRAM(s) Oral at bedtime  aspirin  chewable 81 milliGRAM(s) Oral daily  atorvastatin 20 milliGRAM(s) Oral at bedtime  aztreonam  IVPB 500 milliGRAM(s) IV Intermittent every 12 hours  buDESOnide   0.5 milliGRAM(s) Respule 1 milliGRAM(s) Inhalation daily  carvedilol 25 milliGRAM(s) Oral every 12 hours  clopidogrel Tablet 75 milliGRAM(s) Oral daily  dextrose 5%. 1000 milliLiter(s) (50 mL/Hr) IV Continuous <Continuous>  dextrose 50% Injectable 12.5 Gram(s) IV Push once  dextrose 50% Injectable 25 Gram(s) IV Push once  dextrose 50% Injectable 25 Gram(s) IV Push once  DULoxetine 120 milliGRAM(s) Oral daily  ethacrynic acid 25 milliGRAM(s) Oral daily  gabapentin 300 milliGRAM(s) Oral at bedtime  insulin glargine Injectable (LANTUS) 40 Unit(s) SubCutaneous every morning  insulin lispro (HumaLOG) corrective regimen sliding scale   SubCutaneous three times a day before meals  metoclopramide 10 milliGRAM(s) Oral at bedtime  pantoprazole    Tablet 40 milliGRAM(s) Oral before breakfast  predniSONE   Tablet 40 milliGRAM(s) Oral daily  tamsulosin 0.8 milliGRAM(s) Oral at bedtime    MEDICATIONS  (PRN):  acetaminophen   Tablet 650 milliGRAM(s) Oral every 6 hours PRN For Temp greater than 38 C (100.4 F)  ALBUTerol/ipratropium for Nebulization 3 milliLiter(s) Nebulizer every 6 hours PRN Shortness of Breath and/or Wheezing  dextrose 40% Gel 15 Gram(s) Oral once PRN Blood Glucose LESS THAN 70 milliGRAM(s)/deciliter  glucagon  Injectable 1 milliGRAM(s) IntraMuscular once PRN Glucose LESS THAN 70 milligrams/deciliter  heparin  Injectable 6000 Unit(s) IV Push every 6 hours PRN For aPTT less than 40    Vital Signs Last 24 Hrs  T(F): 98.6 (30 Aug 2018 11:31), Max: 99.5 (29 Aug 2018 16:00)  HR: 66 (30 Aug 2018 11:31) (56 - 68)  BP: 128/57 (30 Aug 2018 11:31) (128/57 - 155/61)  RR: 16 (30 Aug 2018 11:31) (14 - 16)  SpO2: 100% (30 Aug 2018 11:31) (95% - 100%)  I&O's Summary    29 Aug 2018 07:  -  30 Aug 2018 07:00  --------------------------------------------------------  IN: 880 mL / OUT: 1300 mL / NET: -420 mL    30 Aug 2018 07:  -  30 Aug 2018 15:19  --------------------------------------------------------  IN: 400 mL / OUT: 750 mL / NET: -350 mL      BMI (kg/m2): 34.7 (18 @ 05:00)  PHYSICAL EXAM:  General: NAD, A/O x 3  Neck: Supple, No JVD  Lungs: Clear to auscultation bilaterally  Cardio: RRR, S1/S2, No murmurs  Abdomen: soft, nontender, hypoactive  Extremities:  LABS:                        9.8    8.6   )-----------( 196      ( 30 Aug 2018 06:53 )             30.0           141  |  108  |  54  ----------------------------<  88  4.0   |  24  |  1.83    Ca    8.4      30 Aug 2018 06:53       eGFR if Non African American: 36 mL/min/1.73M2 (18 @ 06:53)  eGFR if African American: 42 mL/min/1.73M2 (18 @ 06:53)    PTT - ( 27 Aug 2018 18:20 )  PTT:25.7 sec             CAPILLARY BLOOD GLUCOSE      POCT Blood Glucose.: 211 mg/dL (30 Aug 2018 11:26)  POCT Blood Glucose.: 96 mg/dL (30 Aug 2018 08:07)  POCT Blood Glucose.: 225 mg/dL (29 Aug 2018 16:43)    Urinalysis Basic - ( 29 Aug 2018 17:30 )    Color: Yellow / Appearance: Clear / S.015 / pH: x  Gluc: x / Ketone: Negative  / Bili: Negative / Urobili: Negative   Blood: x / Protein: 100 / Nitrite: Negative   Leuk Esterase: Trace / RBC: Negative /HPF / WBC Negative /HPF   Sq Epi: x / Non Sq Epi: Neg.-Few / Bacteria: Negative /HPF         [id number] 5.4      RADIOLOGY & ADDITIONAL TESTS:    Care Discussed with Consultants/Other Providers: Patient is a 73y old  Male who presents with a chief complaint of Fever, weakness, trouble urinating (25 Aug 2018 13:11)      Patient seen and examined at bedside.    ALLERGIES:  Avelox (Unknown)  cefadroxil (Unknown)  levofloxacin (Unknown)  sulfa drugs (Unknown)    MEDICATIONS  (STANDING):  ALPRAZolam 0.5 milliGRAM(s) Oral at bedtime  aspirin  chewable 81 milliGRAM(s) Oral daily  atorvastatin 20 milliGRAM(s) Oral at bedtime  aztreonam  IVPB 500 milliGRAM(s) IV Intermittent every 12 hours  buDESOnide   0.5 milliGRAM(s) Respule 1 milliGRAM(s) Inhalation daily  carvedilol 25 milliGRAM(s) Oral every 12 hours  clopidogrel Tablet 75 milliGRAM(s) Oral daily  dextrose 5%. 1000 milliLiter(s) (50 mL/Hr) IV Continuous <Continuous>  dextrose 50% Injectable 12.5 Gram(s) IV Push once  dextrose 50% Injectable 25 Gram(s) IV Push once  dextrose 50% Injectable 25 Gram(s) IV Push once  DULoxetine 120 milliGRAM(s) Oral daily  ethacrynic acid 25 milliGRAM(s) Oral daily  gabapentin 300 milliGRAM(s) Oral at bedtime  insulin glargine Injectable (LANTUS) 40 Unit(s) SubCutaneous every morning  insulin lispro (HumaLOG) corrective regimen sliding scale   SubCutaneous three times a day before meals  metoclopramide 10 milliGRAM(s) Oral at bedtime  pantoprazole    Tablet 40 milliGRAM(s) Oral before breakfast  predniSONE   Tablet 40 milliGRAM(s) Oral daily  tamsulosin 0.8 milliGRAM(s) Oral at bedtime    MEDICATIONS  (PRN):  acetaminophen   Tablet 650 milliGRAM(s) Oral every 6 hours PRN For Temp greater than 38 C (100.4 F)  ALBUTerol/ipratropium for Nebulization 3 milliLiter(s) Nebulizer every 6 hours PRN Shortness of Breath and/or Wheezing  dextrose 40% Gel 15 Gram(s) Oral once PRN Blood Glucose LESS THAN 70 milliGRAM(s)/deciliter  glucagon  Injectable 1 milliGRAM(s) IntraMuscular once PRN Glucose LESS THAN 70 milligrams/deciliter  heparin  Injectable 6000 Unit(s) IV Push every 6 hours PRN For aPTT less than 40    Vital Signs Last 24 Hrs  T(F): 98.6 (30 Aug 2018 11:31), Max: 99.5 (29 Aug 2018 16:00)  HR: 66 (30 Aug 2018 11:31) (56 - 68)  BP: 128/57 (30 Aug 2018 11:31) (128/57 - 155/61)  RR: 16 (30 Aug 2018 11:31) (14 - 16)  SpO2: 100% (30 Aug 2018 11:31) (95% - 100%)  I&O's Summary    29 Aug 2018 07:  -  30 Aug 2018 07:00  --------------------------------------------------------  IN: 880 mL / OUT: 1300 mL / NET: -420 mL    30 Aug 2018 07:  -  30 Aug 2018 15:19  --------------------------------------------------------  IN: 400 mL / OUT: 750 mL / NET: -350 mL      BMI (kg/m2): 34.7 (18 @ 05:00)  PHYSICAL EXAM:  General: NAD, A/O x 3  Neck: Supple, No JVD  Lungs: Clear to auscultation bilaterally  Cardio: RRR, S1/S2, No murmurs  Abdomen: soft, nontender, hypoactive  Extremities: chronic vascular changes    LABS:                        9.8    8.6   )-----------( 196      ( 30 Aug 2018 06:53 )             30.0           141  |  108  |  54  ----------------------------<  88  4.0   |  24  |  1.83    Ca    8.4      30 Aug 2018 06:53       eGFR if Non African American: 36 mL/min/1.73M2 (18 @ 06:53)  eGFR if African American: 42 mL/min/1.73M2 (18 @ 06:53)    PTT - ( 27 Aug 2018 18:20 )  PTT:25.7 sec             CAPILLARY BLOOD GLUCOSE      POCT Blood Glucose.: 211 mg/dL (30 Aug 2018 11:26)  POCT Blood Glucose.: 96 mg/dL (30 Aug 2018 08:07)  POCT Blood Glucose.: 225 mg/dL (29 Aug 2018 16:43)    Urinalysis Basic - ( 29 Aug 2018 17:30 )    Color: Yellow / Appearance: Clear / S.015 / pH: x  Gluc: x / Ketone: Negative  / Bili: Negative / Urobili: Negative   Blood: x / Protein: 100 / Nitrite: Negative   Leuk Esterase: Trace / RBC: Negative /HPF / WBC Negative /HPF   Sq Epi: x / Non Sq Epi: Neg.-Few / Bacteria: Negative /HPF         [id number] 5.4      RADIOLOGY & ADDITIONAL TESTS:    Care Discussed with Consultants/Other Providers: Patient is a 73y old  Male who presents with a chief complaint of Fever, weakness, trouble urinating (25 Aug 2018 13:11)      Patient seen and examined at bedside.    ALLERGIES:  Avelox (Unknown)  cefadroxil (Unknown)  levofloxacin (Unknown)  sulfa drugs (Unknown)    MEDICATIONS  (STANDING):  ALPRAZolam 0.5 milliGRAM(s) Oral at bedtime  aspirin  chewable 81 milliGRAM(s) Oral daily  atorvastatin 20 milliGRAM(s) Oral at bedtime  aztreonam  IVPB 500 milliGRAM(s) IV Intermittent every 12 hours  buDESOnide   0.5 milliGRAM(s) Respule 1 milliGRAM(s) Inhalation daily  carvedilol 25 milliGRAM(s) Oral every 12 hours  clopidogrel Tablet 75 milliGRAM(s) Oral daily  dextrose 5%. 1000 milliLiter(s) (50 mL/Hr) IV Continuous <Continuous>  dextrose 50% Injectable 12.5 Gram(s) IV Push once  dextrose 50% Injectable 25 Gram(s) IV Push once  dextrose 50% Injectable 25 Gram(s) IV Push once  DULoxetine 120 milliGRAM(s) Oral daily  ethacrynic acid 25 milliGRAM(s) Oral daily  gabapentin 300 milliGRAM(s) Oral at bedtime  insulin glargine Injectable (LANTUS) 40 Unit(s) SubCutaneous every morning  insulin lispro (HumaLOG) corrective regimen sliding scale   SubCutaneous three times a day before meals  metoclopramide 10 milliGRAM(s) Oral at bedtime  pantoprazole    Tablet 40 milliGRAM(s) Oral before breakfast  predniSONE   Tablet 40 milliGRAM(s) Oral daily  tamsulosin 0.8 milliGRAM(s) Oral at bedtime    MEDICATIONS  (PRN):  acetaminophen   Tablet 650 milliGRAM(s) Oral every 6 hours PRN For Temp greater than 38 C (100.4 F)  ALBUTerol/ipratropium for Nebulization 3 milliLiter(s) Nebulizer every 6 hours PRN Shortness of Breath and/or Wheezing  dextrose 40% Gel 15 Gram(s) Oral once PRN Blood Glucose LESS THAN 70 milliGRAM(s)/deciliter  glucagon  Injectable 1 milliGRAM(s) IntraMuscular once PRN Glucose LESS THAN 70 milligrams/deciliter  heparin  Injectable 6000 Unit(s) IV Push every 6 hours PRN For aPTT less than 40    Vital Signs Last 24 Hrs  T(F): 98.6 (30 Aug 2018 11:31), Max: 99.5 (29 Aug 2018 16:00)  HR: 66 (30 Aug 2018 11:31) (56 - 68)  BP: 128/57 (30 Aug 2018 11:31) (128/57 - 155/61)  RR: 16 (30 Aug 2018 11:31) (14 - 16)  SpO2: 100% (30 Aug 2018 11:31) (95% - 100%)  I&O's Summary    29 Aug 2018 07:  -  30 Aug 2018 07:00  --------------------------------------------------------  IN: 880 mL / OUT: 1300 mL / NET: -420 mL    30 Aug 2018 07:  -  30 Aug 2018 15:19  --------------------------------------------------------  IN: 400 mL / OUT: 750 mL / NET: -350 mL      BMI (kg/m2): 34.7 (18 @ 05:00)  PHYSICAL EXAM:  General: NAD, A/O x 3  Neck: Supple, No JVD  Lungs: Clear to auscultation bilaterally  Cardio: RRR, S1/S2, No murmurs  Abdomen: soft, nontender, hypoactive  Extremities: chronic vascular changes    LABS:                        9.8    8.6   )-----------( 196      ( 30 Aug 2018 06:53 )             30.0           141  |  108  |  54  ----------------------------<  88  4.0   |  24  |  1.83    Ca    8.4      30 Aug 2018 06:53       eGFR if Non African American: 36 mL/min/1.73M2 (18 @ 06:53)  eGFR if African American: 42 mL/min/1.73M2 (18 @ 06:53)    PTT - ( 27 Aug 2018 18:20 )  PTT:25.7 sec     CAPILLARY BLOOD GLUCOSE      POCT Blood Glucose.: 211 mg/dL (30 Aug 2018 11:26)  POCT Blood Glucose.: 96 mg/dL (30 Aug 2018 08:07)  POCT Blood Glucose.: 225 mg/dL (29 Aug 2018 16:43)    Urinalysis Basic - ( 29 Aug 2018 17:30 )    Color: Yellow / Appearance: Clear / S.015 / pH: x  Gluc: x / Ketone: Negative  / Bili: Negative / Urobili: Negative   Blood: x / Protein: 100 / Nitrite: Negative   Leuk Esterase: Trace / RBC: Negative /HPF / WBC Negative /HPF   Sq Epi: x / Non Sq Epi: Neg.-Few / Bacteria: Negative /HPF         [id number] 5.4      RADIOLOGY & ADDITIONAL TESTS:    Care Discussed with Consultants/Other Providers:

## 2018-08-31 LAB
ANION GAP SERPL CALC-SCNC: 9 MMOL/L — SIGNIFICANT CHANGE UP (ref 5–17)
BUN SERPL-MCNC: 56 MG/DL — HIGH (ref 7–23)
CALCIUM SERPL-MCNC: 8.6 MG/DL — SIGNIFICANT CHANGE UP (ref 8.4–10.5)
CHLORIDE SERPL-SCNC: 107 MMOL/L — SIGNIFICANT CHANGE UP (ref 96–108)
CO2 SERPL-SCNC: 24 MMOL/L — SIGNIFICANT CHANGE UP (ref 22–31)
CREAT SERPL-MCNC: 1.77 MG/DL — HIGH (ref 0.5–1.3)
GLUCOSE BLDC GLUCOMTR-MCNC: 182 MG/DL — HIGH (ref 70–99)
GLUCOSE BLDC GLUCOMTR-MCNC: 198 MG/DL — HIGH (ref 70–99)
GLUCOSE BLDC GLUCOMTR-MCNC: 200 MG/DL — HIGH (ref 70–99)
GLUCOSE BLDC GLUCOMTR-MCNC: 90 MG/DL — SIGNIFICANT CHANGE UP (ref 70–99)
GLUCOSE SERPL-MCNC: 88 MG/DL — SIGNIFICANT CHANGE UP (ref 70–99)
HCT VFR BLD CALC: 30 % — LOW (ref 39–50)
HGB BLD-MCNC: 9.8 G/DL — LOW (ref 13–17)
MCHC RBC-ENTMCNC: 29.5 PG — SIGNIFICANT CHANGE UP (ref 27–34)
MCHC RBC-ENTMCNC: 32.7 GM/DL — SIGNIFICANT CHANGE UP (ref 32–36)
MCV RBC AUTO: 90.2 FL — SIGNIFICANT CHANGE UP (ref 80–100)
PLATELET # BLD AUTO: 207 K/UL — SIGNIFICANT CHANGE UP (ref 150–400)
POTASSIUM SERPL-MCNC: 4.1 MMOL/L — SIGNIFICANT CHANGE UP (ref 3.5–5.3)
POTASSIUM SERPL-SCNC: 4.1 MMOL/L — SIGNIFICANT CHANGE UP (ref 3.5–5.3)
RBC # BLD: 3.33 M/UL — LOW (ref 4.2–5.8)
RBC # FLD: 15.2 % — HIGH (ref 10.3–14.5)
SODIUM SERPL-SCNC: 140 MMOL/L — SIGNIFICANT CHANGE UP (ref 135–145)
URATE SERPL-MCNC: 5.9 MG/DL — SIGNIFICANT CHANGE UP (ref 3.4–8.8)
WBC # BLD: 10.4 K/UL — SIGNIFICANT CHANGE UP (ref 3.8–10.5)
WBC # FLD AUTO: 10.4 K/UL — SIGNIFICANT CHANGE UP (ref 3.8–10.5)

## 2018-08-31 RX ADMIN — Medication 50 MILLIGRAM(S): at 17:02

## 2018-08-31 RX ADMIN — PANTOPRAZOLE SODIUM 40 MILLIGRAM(S): 20 TABLET, DELAYED RELEASE ORAL at 06:07

## 2018-08-31 RX ADMIN — ATORVASTATIN CALCIUM 20 MILLIGRAM(S): 80 TABLET, FILM COATED ORAL at 21:03

## 2018-08-31 RX ADMIN — CLOPIDOGREL BISULFATE 75 MILLIGRAM(S): 75 TABLET, FILM COATED ORAL at 11:06

## 2018-08-31 RX ADMIN — GABAPENTIN 300 MILLIGRAM(S): 400 CAPSULE ORAL at 21:03

## 2018-08-31 RX ADMIN — ETHACRYNIC ACID 25 MILLIGRAM(S): 25 TABLET ORAL at 05:51

## 2018-08-31 RX ADMIN — Medication 40 MILLIGRAM(S): at 05:51

## 2018-08-31 RX ADMIN — Medication 2: at 11:06

## 2018-08-31 RX ADMIN — Medication 50 MILLIGRAM(S): at 05:50

## 2018-08-31 RX ADMIN — Medication 81 MILLIGRAM(S): at 11:06

## 2018-08-31 RX ADMIN — INSULIN GLARGINE 40 UNIT(S): 100 INJECTION, SOLUTION SUBCUTANEOUS at 07:32

## 2018-08-31 RX ADMIN — DULOXETINE HYDROCHLORIDE 120 MILLIGRAM(S): 30 CAPSULE, DELAYED RELEASE ORAL at 11:06

## 2018-08-31 RX ADMIN — Medication 2: at 17:01

## 2018-08-31 RX ADMIN — CARVEDILOL PHOSPHATE 25 MILLIGRAM(S): 80 CAPSULE, EXTENDED RELEASE ORAL at 17:01

## 2018-08-31 RX ADMIN — Medication 0.5 MILLIGRAM(S): at 09:43

## 2018-08-31 RX ADMIN — Medication 3 MILLILITER(S): at 21:32

## 2018-08-31 RX ADMIN — TAMSULOSIN HYDROCHLORIDE 0.8 MILLIGRAM(S): 0.4 CAPSULE ORAL at 21:03

## 2018-08-31 RX ADMIN — Medication 10 MILLIGRAM(S): at 21:03

## 2018-08-31 RX ADMIN — CARVEDILOL PHOSPHATE 25 MILLIGRAM(S): 80 CAPSULE, EXTENDED RELEASE ORAL at 05:51

## 2018-08-31 RX ADMIN — Medication 0.5 MILLIGRAM(S): at 21:06

## 2018-08-31 NOTE — PROGRESS NOTE ADULT - ATTENDING COMMENTS
pt to be dc home with home hospice today
I have personally seen and examined patient on the above date.  I discussed the case with Adelita Coburn NP and I agree with findings and plan as detailed per note above, which I have amended where appropriate.
I have personally seen and examined patient on the above date.  I discussed the case with Adelita Coburn NP and I agree with findings and plan as detailed per note above, which I have amended where appropriate.
I have personally seen and examined patient on the above date.  I discussed the case with Crys Lester and I agree with findings and plan as detailed per note above, which I have amended where appropriate.      Explained risk/benefit to cardiac intervention but patient declined. Will speak with daughter to explain to her risk/benefit as well.
I have personally seen and examined patient on the above date.  I discussed the case with Crys Lester and I agree with findings and plan as detailed per note above, which I have amended where appropriate.

## 2018-08-31 NOTE — PROGRESS NOTE ADULT - SUBJECTIVE AND OBJECTIVE BOX
Denies CP, no distress    Vital Signs Last 24 Hrs  T(C): 37.1 (08-31-18 @ 15:48), Max: 37.1 (08-31-18 @ 15:48)  T(F): 98.8 (08-31-18 @ 15:48), Max: 98.8 (08-31-18 @ 15:48)  HR: 55 (08-31-18 @ 15:48) (55 - 74)  BP: 147/64 (08-31-18 @ 15:48) (118/57 - 147/64)  RR: 15 (08-31-18 @ 15:48) (14 - 16)  SpO2: 100% (08-31-18 @ 15:48) (96% - 100%)    Lungs decr BS b/l bases  Heart S1S2  Abdomen soft, ND, NT  Extr + edema, stasis changes                                                              9.8    10.4  )-----------( 207      ( 31 Aug 2018 05:15 )             30.0     31 Aug 2018 05:15    140    |  107    |  56     ----------------------------<  88     4.1     |  24     |  1.77     Ca    8.6        31 Aug 2018 05:15    acetaminophen   Tablet 650 milliGRAM(s) Oral every 6 hours PRN  ALBUTerol/ipratropium for Nebulization 3 milliLiter(s) Nebulizer every 6 hours PRN  ALPRAZolam 0.5 milliGRAM(s) Oral at bedtime  aspirin  chewable 81 milliGRAM(s) Oral daily  atorvastatin 20 milliGRAM(s) Oral at bedtime  aztreonam  IVPB 500 milliGRAM(s) IV Intermittent every 12 hours  buDESOnide   0.5 milliGRAM(s) Respule 1 milliGRAM(s) Inhalation daily  carvedilol 25 milliGRAM(s) Oral every 12 hours  clopidogrel Tablet 75 milliGRAM(s) Oral daily  dextrose 40% Gel 15 Gram(s) Oral once PRN  dextrose 5%. 1000 milliLiter(s) IV Continuous <Continuous>  dextrose 50% Injectable 12.5 Gram(s) IV Push once  dextrose 50% Injectable 25 Gram(s) IV Push once  dextrose 50% Injectable 25 Gram(s) IV Push once  DULoxetine 120 milliGRAM(s) Oral daily  ethacrynic acid 25 milliGRAM(s) Oral daily  gabapentin 300 milliGRAM(s) Oral at bedtime  glucagon  Injectable 1 milliGRAM(s) IntraMuscular once PRN  heparin  Injectable 6000 Unit(s) IV Push every 6 hours PRN  insulin glargine Injectable (LANTUS) 40 Unit(s) SubCutaneous every morning  insulin lispro (HumaLOG) corrective regimen sliding scale   SubCutaneous three times a day before meals  metoclopramide 10 milliGRAM(s) Oral at bedtime  pantoprazole    Tablet 40 milliGRAM(s) Oral before breakfast  predniSONE   Tablet 40 milliGRAM(s) Oral daily  tamsulosin 0.8 milliGRAM(s) Oral at bedtime    A/P:    S/p HARINDER on CKD III (Cr 1.97 1/7/16) in setting of UTI, cystitis, NSTEMI  Cr has improved and is now at baseline  Avoid ACE/ARB  Continue Edecrine  F/u BMP

## 2018-08-31 NOTE — PROGRESS NOTE ADULT - ASSESSMENT
72yo male from [place] w. PMH DMII, Chronic dCHF, COPD, PVD, CKD p/w SOB, Fever & Difficulty breathing. Admitted w. NSTEMI, HARINDER, UTI, Cystitis. Pending discharge with home hospice at [facility]

## 2018-08-31 NOTE — PROGRESS NOTE ADULT - SUBJECTIVE AND OBJECTIVE BOX
Psychiatry Consultation-Liaison Follow-up Note  73y  Encounter: 2018  Chart reviewed,  	  Met with-patient    Interval History:    Symptom progression-    Medication Changes-  acetaminophen   Tablet 650 milliGRAM(s) Oral every 6 hours PRN  ALBUTerol/ipratropium for Nebulization 3 milliLiter(s) Nebulizer every 6 hours PRN  ALPRAZolam 0.5 milliGRAM(s) Oral at bedtime  aspirin  chewable 81 milliGRAM(s) Oral daily  atorvastatin 20 milliGRAM(s) Oral at bedtime  aztreonam  IVPB 500 milliGRAM(s) IV Intermittent every 12 hours  buDESOnide   0.5 milliGRAM(s) Respule 1 milliGRAM(s) Inhalation daily  carvedilol 25 milliGRAM(s) Oral every 12 hours  clopidogrel Tablet 75 milliGRAM(s) Oral daily  dextrose 40% Gel 15 Gram(s) Oral once PRN  dextrose 5%. 1000 milliLiter(s) IV Continuous <Continuous>  dextrose 50% Injectable 12.5 Gram(s) IV Push once  dextrose 50% Injectable 25 Gram(s) IV Push once  dextrose 50% Injectable 25 Gram(s) IV Push once  DULoxetine 120 milliGRAM(s) Oral daily  ethacrynic acid 25 milliGRAM(s) Oral daily  gabapentin 300 milliGRAM(s) Oral at bedtime  glucagon  Injectable 1 milliGRAM(s) IntraMuscular once PRN  heparin  Injectable 6000 Unit(s) IV Push every 6 hours PRN  insulin glargine Injectable (LANTUS) 40 Unit(s) SubCutaneous every morning  insulin lispro (HumaLOG) corrective regimen sliding scale   SubCutaneous three times a day before meals  metoclopramide 10 milliGRAM(s) Oral at bedtime  pantoprazole    Tablet 40 milliGRAM(s) Oral before breakfast  predniSONE   Tablet 40 milliGRAM(s) Oral daily  tamsulosin 0.8 milliGRAM(s) Oral at bedtime      Mental Status Examination:      Studies:    Laboratory testing-                        9.8    10.4  )-----------( 207      ( 31 Aug 2018 05:15 )             30.0     08-31    140  |  107  |  56<H>  ----------------------------<  88  4.1   |  24  |  1.77<H>    Ca    8.6      31 Aug 2018 05:15  POCT Blood Glucose.: 198 mg/dL (31 Aug 2018 11:03)      Urinalysis Basic - ( 29 Aug 2018 17:30 )    Color: Yellow / Appearance: Clear / S.015 / pH: x  Gluc: x / Ketone: Negative  / Bili: Negative / Urobili: Negative   Blood: x / Protein: 100 / Nitrite: Negative   Leuk Esterase: Trace / RBC: Negative /HPF / WBC Negative /HPF   Sq Epi: x / Non Sq Epi: Neg.-Few / Bacteria: Negative /HPF      Assessment:    Recommendations:    Medication-       Hospital course Follow-up

## 2018-08-31 NOTE — PROGRESS NOTE ADULT - SUBJECTIVE AND OBJECTIVE BOX
Patient is a 73y old  Male who presents with a chief complaint of Fever, weakness, trouble urinating (25 Aug 2018 13:11)      Patient seen and examined at bedside.  Sitting up in bed, reports feeling okay today  ALLERGIES:  Avelox (Unknown)  cefadroxil (Unknown)  levofloxacin (Unknown)  sulfa drugs (Unknown)    MEDICATIONS  (STANDING):  ALPRAZolam 0.5 milliGRAM(s) Oral at bedtime  aspirin  chewable 81 milliGRAM(s) Oral daily  atorvastatin 20 milliGRAM(s) Oral at bedtime  aztreonam  IVPB 500 milliGRAM(s) IV Intermittent every 12 hours  buDESOnide   0.5 milliGRAM(s) Respule 1 milliGRAM(s) Inhalation daily  carvedilol 25 milliGRAM(s) Oral every 12 hours  clopidogrel Tablet 75 milliGRAM(s) Oral daily  dextrose 5%. 1000 milliLiter(s) (50 mL/Hr) IV Continuous <Continuous>  dextrose 50% Injectable 12.5 Gram(s) IV Push once  dextrose 50% Injectable 25 Gram(s) IV Push once  dextrose 50% Injectable 25 Gram(s) IV Push once  DULoxetine 120 milliGRAM(s) Oral daily  ethacrynic acid 25 milliGRAM(s) Oral daily  gabapentin 300 milliGRAM(s) Oral at bedtime  insulin glargine Injectable (LANTUS) 40 Unit(s) SubCutaneous every morning  insulin lispro (HumaLOG) corrective regimen sliding scale   SubCutaneous three times a day before meals  metoclopramide 10 milliGRAM(s) Oral at bedtime  pantoprazole    Tablet 40 milliGRAM(s) Oral before breakfast  predniSONE   Tablet 40 milliGRAM(s) Oral daily  tamsulosin 0.8 milliGRAM(s) Oral at bedtime    MEDICATIONS  (PRN):  acetaminophen   Tablet 650 milliGRAM(s) Oral every 6 hours PRN For Temp greater than 38 C (100.4 F)  ALBUTerol/ipratropium for Nebulization 3 milliLiter(s) Nebulizer every 6 hours PRN Shortness of Breath and/or Wheezing  dextrose 40% Gel 15 Gram(s) Oral once PRN Blood Glucose LESS THAN 70 milliGRAM(s)/deciliter  glucagon  Injectable 1 milliGRAM(s) IntraMuscular once PRN Glucose LESS THAN 70 milligrams/deciliter  heparin  Injectable 6000 Unit(s) IV Push every 6 hours PRN For aPTT less than 40    Vital Signs Last 24 Hrs  T(F): 98.5 (31 Aug 2018 09:56), Max: 98.5 (31 Aug 2018 05:11)  HR: 65 (31 Aug 2018 09:56) (57 - 78)  BP: 118/57 (31 Aug 2018 09:56) (118/57 - 144/74)  RR: 15 (31 Aug 2018 09:56) (14 - 16)  SpO2: 100% (31 Aug 2018 09:56) (96% - 100%)  I&O's Summary    30 Aug 2018 07:  -  31 Aug 2018 07:00  --------------------------------------------------------  IN: 800 mL / OUT: 755 mL / NET: 45 mL    31 Aug 2018 07:  -  31 Aug 2018 12:51  --------------------------------------------------------  IN: 560 mL / OUT: 0 mL / NET: 560 mL        PHYSICAL EXAM:  General: NAD, A/O x 3  Neck: Supple, No JVD  Lungs: Clear to auscultation bilaterally  Cardio: RRR, S1/S2, No murmurs  Abdomen: Soft, Nontender, Nondistended; Bowel sounds present  Extremities: Chronic vascular changes    LABS:                        9.8    10.4  )-----------( 207      ( 31 Aug 2018 05:15 )             30.0           140  |  107  |  56  ----------------------------<  88  4.1   |  24  |  1.77    Ca    8.6      31 Aug 2018 05:15       eGFR if Non African American: 38 mL/min/1.73M2 (18 @ 05:15)  eGFR if African American: 44 mL/min/1.73M2 (18 @ 05:15)       CAPILLARY BLOOD GLUCOSE  POCT Blood Glucose.: 198 mg/dL (31 Aug 2018 11:03)  POCT Blood Glucose.: 90 mg/dL (31 Aug 2018 07:24)  POCT Blood Glucose.: 303 mg/dL (30 Aug 2018 17:13)    Urinalysis Basic - ( 29 Aug 2018 17:30 )    Color: Yellow / Appearance: Clear / S.015 / pH: x  Gluc: x / Ketone: Negative  / Bili: Negative / Urobili: Negative   Blood: x / Protein: 100 / Nitrite: Negative   Leuk Esterase: Trace / RBC: Negative /HPF / WBC Negative /HPF   Sq Epi: x / Non Sq Epi: Neg.-Few / Bacteria: Negative /HPF        Culture - Urine (collected 29 Aug 2018 17:30)  Source: .Urine Clean Catch (Midstream)  Final Report (30 Aug 2018 21:00):    No growth      08- [id number] 5.4      Care Discussed with Consultants/Other Providers: Dr. Vasquez

## 2018-09-01 ENCOUNTER — TRANSCRIPTION ENCOUNTER (OUTPATIENT)
Age: 73
End: 2018-09-01

## 2018-09-01 VITALS
DIASTOLIC BLOOD PRESSURE: 55 MMHG | OXYGEN SATURATION: 95 % | RESPIRATION RATE: 16 BRPM | SYSTOLIC BLOOD PRESSURE: 131 MMHG | TEMPERATURE: 98 F | HEART RATE: 63 BPM

## 2018-09-01 LAB
ANION GAP SERPL CALC-SCNC: 8 MMOL/L — SIGNIFICANT CHANGE UP (ref 5–17)
BUN SERPL-MCNC: 58 MG/DL — HIGH (ref 7–23)
CALCIUM SERPL-MCNC: 8.2 MG/DL — LOW (ref 8.4–10.5)
CHLORIDE SERPL-SCNC: 107 MMOL/L — SIGNIFICANT CHANGE UP (ref 96–108)
CO2 SERPL-SCNC: 25 MMOL/L — SIGNIFICANT CHANGE UP (ref 22–31)
CREAT SERPL-MCNC: 1.83 MG/DL — HIGH (ref 0.5–1.3)
GLUCOSE BLDC GLUCOMTR-MCNC: 188 MG/DL — HIGH (ref 70–99)
GLUCOSE BLDC GLUCOMTR-MCNC: 90 MG/DL — SIGNIFICANT CHANGE UP (ref 70–99)
GLUCOSE SERPL-MCNC: 87 MG/DL — SIGNIFICANT CHANGE UP (ref 70–99)
HCT VFR BLD CALC: 29.1 % — LOW (ref 39–50)
HGB BLD-MCNC: 9.4 G/DL — LOW (ref 13–17)
MCHC RBC-ENTMCNC: 28.9 PG — SIGNIFICANT CHANGE UP (ref 27–34)
MCHC RBC-ENTMCNC: 32.4 GM/DL — SIGNIFICANT CHANGE UP (ref 32–36)
MCV RBC AUTO: 89.3 FL — SIGNIFICANT CHANGE UP (ref 80–100)
PLATELET # BLD AUTO: 195 K/UL — SIGNIFICANT CHANGE UP (ref 150–400)
POTASSIUM SERPL-MCNC: 4.2 MMOL/L — SIGNIFICANT CHANGE UP (ref 3.5–5.3)
POTASSIUM SERPL-SCNC: 4.2 MMOL/L — SIGNIFICANT CHANGE UP (ref 3.5–5.3)
RBC # BLD: 3.26 M/UL — LOW (ref 4.2–5.8)
RBC # FLD: 14.9 % — HIGH (ref 10.3–14.5)
SODIUM SERPL-SCNC: 140 MMOL/L — SIGNIFICANT CHANGE UP (ref 135–145)
WBC # BLD: 8 K/UL — SIGNIFICANT CHANGE UP (ref 3.8–10.5)
WBC # FLD AUTO: 8 K/UL — SIGNIFICANT CHANGE UP (ref 3.8–10.5)

## 2018-09-01 PROCEDURE — 94640 AIRWAY INHALATION TREATMENT: CPT

## 2018-09-01 PROCEDURE — 80048 BASIC METABOLIC PNL TOTAL CA: CPT

## 2018-09-01 PROCEDURE — 81001 URINALYSIS AUTO W/SCOPE: CPT

## 2018-09-01 PROCEDURE — 87040 BLOOD CULTURE FOR BACTERIA: CPT

## 2018-09-01 PROCEDURE — 82962 GLUCOSE BLOOD TEST: CPT

## 2018-09-01 PROCEDURE — 85610 PROTHROMBIN TIME: CPT

## 2018-09-01 PROCEDURE — 83605 ASSAY OF LACTIC ACID: CPT

## 2018-09-01 PROCEDURE — 85027 COMPLETE CBC AUTOMATED: CPT

## 2018-09-01 PROCEDURE — 93306 TTE W/DOPPLER COMPLETE: CPT

## 2018-09-01 PROCEDURE — 96360 HYDRATION IV INFUSION INIT: CPT

## 2018-09-01 PROCEDURE — 71250 CT THORAX DX C-: CPT

## 2018-09-01 PROCEDURE — 99284 EMERGENCY DEPT VISIT MOD MDM: CPT | Mod: 25

## 2018-09-01 PROCEDURE — 80053 COMPREHEN METABOLIC PANEL: CPT

## 2018-09-01 PROCEDURE — 87086 URINE CULTURE/COLONY COUNT: CPT

## 2018-09-01 PROCEDURE — 71045 X-RAY EXAM CHEST 1 VIEW: CPT

## 2018-09-01 PROCEDURE — 82550 ASSAY OF CK (CPK): CPT

## 2018-09-01 PROCEDURE — 83880 ASSAY OF NATRIURETIC PEPTIDE: CPT

## 2018-09-01 PROCEDURE — 83036 HEMOGLOBIN GLYCOSYLATED A1C: CPT

## 2018-09-01 PROCEDURE — 96374 THER/PROPH/DIAG INJ IV PUSH: CPT

## 2018-09-01 PROCEDURE — 84550 ASSAY OF BLOOD/URIC ACID: CPT

## 2018-09-01 PROCEDURE — 84484 ASSAY OF TROPONIN QUANT: CPT

## 2018-09-01 PROCEDURE — 36600 WITHDRAWAL OF ARTERIAL BLOOD: CPT

## 2018-09-01 PROCEDURE — 82803 BLOOD GASES ANY COMBINATION: CPT

## 2018-09-01 PROCEDURE — 93005 ELECTROCARDIOGRAM TRACING: CPT

## 2018-09-01 PROCEDURE — 94660 CPAP INITIATION&MGMT: CPT

## 2018-09-01 PROCEDURE — 99238 HOSP IP/OBS DSCHRG MGMT 30/<: CPT

## 2018-09-01 PROCEDURE — 85730 THROMBOPLASTIN TIME PARTIAL: CPT

## 2018-09-01 PROCEDURE — 74176 CT ABD & PELVIS W/O CONTRAST: CPT

## 2018-09-01 PROCEDURE — 99285 EMERGENCY DEPT VISIT HI MDM: CPT | Mod: 25

## 2018-09-01 RX ORDER — CARVEDILOL PHOSPHATE 80 MG/1
1 CAPSULE, EXTENDED RELEASE ORAL
Qty: 60 | Refills: 0 | OUTPATIENT
Start: 2018-09-01 | End: 2018-09-30

## 2018-09-01 RX ORDER — FUROSEMIDE 40 MG
40 TABLET ORAL
Qty: 15 | Refills: 0 | OUTPATIENT
Start: 2018-09-01 | End: 2018-09-30

## 2018-09-01 RX ORDER — TAMSULOSIN HYDROCHLORIDE 0.4 MG/1
2 CAPSULE ORAL
Qty: 0 | Refills: 0 | COMMUNITY

## 2018-09-01 RX ORDER — CARVEDILOL PHOSPHATE 80 MG/1
1 CAPSULE, EXTENDED RELEASE ORAL
Qty: 0 | Refills: 0 | COMMUNITY

## 2018-09-01 RX ORDER — DULOXETINE HYDROCHLORIDE 30 MG/1
2 CAPSULE, DELAYED RELEASE ORAL
Qty: 120 | Refills: 0 | OUTPATIENT
Start: 2018-09-01 | End: 2018-09-30

## 2018-09-01 RX ORDER — TAMSULOSIN HYDROCHLORIDE 0.4 MG/1
1 CAPSULE ORAL
Qty: 0 | Refills: 0 | COMMUNITY

## 2018-09-01 RX ORDER — ETHACRYNIC ACID 25 MG/1
1 TABLET ORAL
Qty: 30 | Refills: 0 | OUTPATIENT
Start: 2018-09-01 | End: 2018-09-30

## 2018-09-01 RX ORDER — DILTIAZEM HCL 120 MG
0 CAPSULE, EXT RELEASE 24 HR ORAL
Qty: 0 | Refills: 0 | COMMUNITY

## 2018-09-01 RX ORDER — DULOXETINE HYDROCHLORIDE 30 MG/1
1 CAPSULE, DELAYED RELEASE ORAL
Qty: 0 | Refills: 0 | COMMUNITY

## 2018-09-01 RX ORDER — CLOPIDOGREL BISULFATE 75 MG/1
1 TABLET, FILM COATED ORAL
Qty: 30 | Refills: 0 | OUTPATIENT
Start: 2018-09-01 | End: 2018-09-30

## 2018-09-01 RX ORDER — FUROSEMIDE 40 MG
0 TABLET ORAL
Qty: 0 | Refills: 0 | COMMUNITY

## 2018-09-01 RX ORDER — FUROSEMIDE 40 MG
40 TABLET ORAL ONCE
Qty: 0 | Refills: 0 | Status: COMPLETED | OUTPATIENT
Start: 2018-09-01 | End: 2018-09-01

## 2018-09-01 RX ADMIN — Medication 50 MILLIGRAM(S): at 07:08

## 2018-09-01 RX ADMIN — Medication 1 MILLIGRAM(S): at 09:03

## 2018-09-01 RX ADMIN — Medication 81 MILLIGRAM(S): at 12:11

## 2018-09-01 RX ADMIN — PANTOPRAZOLE SODIUM 40 MILLIGRAM(S): 20 TABLET, DELAYED RELEASE ORAL at 06:35

## 2018-09-01 RX ADMIN — DULOXETINE HYDROCHLORIDE 120 MILLIGRAM(S): 30 CAPSULE, DELAYED RELEASE ORAL at 12:12

## 2018-09-01 RX ADMIN — CLOPIDOGREL BISULFATE 75 MILLIGRAM(S): 75 TABLET, FILM COATED ORAL at 12:11

## 2018-09-01 RX ADMIN — Medication 40 MILLIGRAM(S): at 06:35

## 2018-09-01 RX ADMIN — CARVEDILOL PHOSPHATE 25 MILLIGRAM(S): 80 CAPSULE, EXTENDED RELEASE ORAL at 06:35

## 2018-09-01 RX ADMIN — ETHACRYNIC ACID 25 MILLIGRAM(S): 25 TABLET ORAL at 06:35

## 2018-09-01 RX ADMIN — Medication 40 MILLIGRAM(S): at 10:11

## 2018-09-01 RX ADMIN — INSULIN GLARGINE 40 UNIT(S): 100 INJECTION, SOLUTION SUBCUTANEOUS at 07:59

## 2018-09-01 RX ADMIN — Medication 2: at 12:12

## 2018-09-01 NOTE — DISCHARGE NOTE ADULT - PATIENT PORTAL LINK FT
You can access the [facility] Patient Portal, offered by [facility], by registering with the following website: [email or web address]

## 2018-09-01 NOTE — DISCHARGE NOTE ADULT - SECONDARY DIAGNOSIS.
Acute metabolic encephalopathy Acute respiratory failure with hypoxia Non-ST elevation (NSTEMI) myocardial infarction Obstructive sleep apnea syndrome Severe depression Stage 4 chronic kidney disease

## 2018-09-01 NOTE — DISCHARGE NOTE ADULT - PLAN OF CARE
resolved   with IV abx back to base line.  pt returning to [place] on Hospice resolved resolved continue po prednisone X3 days Continue to monitor BP, asa, plavix Bipap at night continue current meds stable

## 2018-09-01 NOTE — DISCHARGE NOTE ADULT - MEDICATION SUMMARY - MEDICATIONS TO CHANGE
I will SWITCH the dose or number of times a day I take the medications listed below when I get home from the hospital:    furosemide 40 mg oral tablet  -- orally 2 times a day I will SWITCH the dose or number of times a day I take the medications listed below when I get home from the hospital:    Flomax 0.4 mg oral capsule  -- 1 cap(s) by mouth once a day    furosemide 40 mg oral tablet  -- orally 2 times a day    DULoxetine 30 mg oral delayed release capsule  -- 1 cap(s) by mouth once a day 6pm

## 2018-09-01 NOTE — DISCHARGE NOTE ADULT - MEDICATION SUMMARY - MEDICATIONS TO STOP TAKING
I will STOP taking the medications listed below when I get home from the hospital:  None I will STOP taking the medications listed below when I get home from the hospital:    dilTIAZem 120 mg oral tablet  -- orally once a day

## 2018-09-01 NOTE — PROGRESS NOTE ADULT - SUBJECTIVE AND OBJECTIVE BOX
Patient is a 73y old  Male who presents with a chief complaint of Fever, weakness, trouble urinating       Patient seen and examined at bedside.    ALLERGIES:  Avelox (Unknown)  cefadroxil (Unknown)  levofloxacin (Unknown)  sulfa drugs (Unknown)    MEDICATIONS  (STANDING):  ALPRAZolam 0.5 milliGRAM(s) Oral at bedtime  aspirin  chewable 81 milliGRAM(s) Oral daily  atorvastatin 20 milliGRAM(s) Oral at bedtime  aztreonam  IVPB 500 milliGRAM(s) IV Intermittent every 12 hours  buDESOnide   0.5 milliGRAM(s) Respule 1 milliGRAM(s) Inhalation daily  carvedilol 25 milliGRAM(s) Oral every 12 hours  clopidogrel Tablet 75 milliGRAM(s) Oral daily  dextrose 5%. 1000 milliLiter(s) (50 mL/Hr) IV Continuous <Continuous>  dextrose 50% Injectable 12.5 Gram(s) IV Push once  dextrose 50% Injectable 25 Gram(s) IV Push once  dextrose 50% Injectable 25 Gram(s) IV Push once  DULoxetine 120 milliGRAM(s) Oral daily  ethacrynic acid 25 milliGRAM(s) Oral daily  gabapentin 300 milliGRAM(s) Oral at bedtime  insulin glargine Injectable (LANTUS) 40 Unit(s) SubCutaneous every morning  insulin lispro (HumaLOG) corrective regimen sliding scale   SubCutaneous three times a day before meals  metoclopramide 10 milliGRAM(s) Oral at bedtime  pantoprazole    Tablet 40 milliGRAM(s) Oral before breakfast  predniSONE   Tablet 40 milliGRAM(s) Oral daily  tamsulosin 0.8 milliGRAM(s) Oral at bedtime    MEDICATIONS  (PRN):  acetaminophen   Tablet 650 milliGRAM(s) Oral every 6 hours PRN For Temp greater than 38 C (100.4 F)  ALBUTerol/ipratropium for Nebulization 3 milliLiter(s) Nebulizer every 6 hours PRN Shortness of Breath and/or Wheezing  dextrose 40% Gel 15 Gram(s) Oral once PRN Blood Glucose LESS THAN 70 milliGRAM(s)/deciliter  glucagon  Injectable 1 milliGRAM(s) IntraMuscular once PRN Glucose LESS THAN 70 milligrams/deciliter  heparin  Injectable 6000 Unit(s) IV Push every 6 hours PRN For aPTT less than 40    Vital Signs Last 24 Hrs  T(F): 98.2 (01 Sep 2018 08:27), Max: 98.8 (31 Aug 2018 15:48)  HR: 63 (01 Sep 2018 06:27) (55 - 65)  BP: 121/56 (01 Sep 2018 08:27) (118/57 - 153/66)  RR: 16 (01 Sep 2018 08:27) (15 - 17)  SpO2: 96% (01 Sep 2018 08:27) (95% - 100%)  I&O's Summary    31 Aug 2018 07:01  -  01 Sep 2018 07:00  --------------------------------------------------------  IN: 985 mL / OUT: 702 mL / NET: 283 mL      PHYSICAL EXAM:  General: NAD, A/O x 3  ENT: MMM  Neck: Supple, No JVD  Lungs: Clear to auscultation bilaterally  Cardio: RRR, S1/S2, No murmurs  Abdomen: Soft, Nontender, Nondistended; Bowel sounds present  Extremities: No calf tenderness, No pitting edema    LABS:                        9.4    8.0   )-----------( 195      ( 01 Sep 2018 06:27 )             29.1         140  |  107  |  58  ----------------------------<  87  4.2   |  25  |  1.83    Ca    8.2      01 Sep 2018 06:27      eGFR if Non African American: 36 mL/min/1.73M2 (18 @ 06:27)  eGFR if African American: 42 mL/min/1.73M2 (18 @ 06:27)                    CAPILLARY BLOOD GLUCOSE      POCT Blood Glucose.: 90 mg/dL (01 Sep 2018 07:27)  POCT Blood Glucose.: 182 mg/dL (31 Aug 2018 20:59)  POCT Blood Glucose.: 200 mg/dL (31 Aug 2018 16:42)  POCT Blood Glucose.: 198 mg/dL (31 Aug 2018 11:03)    08-26 [id number] 5.4    Urinalysis Basic - ( 29 Aug 2018 17:30 )    Color: Yellow / Appearance: Clear / S.015 / pH: x  Gluc: x / Ketone: Negative  / Bili: Negative / Urobili: Negative   Blood: x / Protein: 100 / Nitrite: Negative   Leuk Esterase: Trace / RBC: Negative /HPF / WBC Negative /HPF   Sq Epi: x / Non Sq Epi: Neg.-Few / Bacteria: Negative /HPF        Culture - Urine (collected 29 Aug 2018 17:30)  Source: .Urine Clean Catch (Midstream)  Final Report (30 Aug 2018 21:00):    No growth      RADIOLOGY & ADDITIONAL TESTS:    Care Discussed with Consultants/Other Providers:

## 2018-09-01 NOTE — DISCHARGE NOTE ADULT - HOSPITAL COURSE
Pt is a 73 y o M from [place] presented with weakness, shortness of breath, fever, seen in ED yesterday, negative CT chest, sent home, today with fever. Pt states he has had trouble urinating for the last few days.

## 2018-09-01 NOTE — PROGRESS NOTE ADULT - ASSESSMENT
72yo male from [place] w. PMH DMII, Chronic dCHF, COPD, PVD, CKD p/w SOB, Fever & Difficulty breathing. Admitted w. NSTEMI, HARINDER, UTI, Cystitis. Pending discharge with home hospice at [place]

## 2018-09-01 NOTE — PROGRESS NOTE ADULT - PROBLEM SELECTOR PROBLEM 1
Acute cystitis without hematuria

## 2018-09-01 NOTE — DISCHARGE NOTE ADULT - CARE PLAN
Principal Discharge DX:	Acute cystitis without hematuria  Goal:	resolved   with IV abx  Assessment and plan of treatment:	back to base line.  pt returning to [place] on Hospice  Secondary Diagnosis:	Acute metabolic encephalopathy  Assessment and plan of treatment:	resolved  Secondary Diagnosis:	Acute respiratory failure with hypoxia  Assessment and plan of treatment:	resolved continue po prednisone X3 days  Secondary Diagnosis:	Non-ST elevation (NSTEMI) myocardial infarction  Assessment and plan of treatment:	Continue to monitor BP, asa, plavix  Secondary Diagnosis:	Obstructive sleep apnea syndrome  Assessment and plan of treatment:	Bipap at night  Secondary Diagnosis:	Severe depression  Assessment and plan of treatment:	continue current meds  Secondary Diagnosis:	Stage 4 chronic kidney disease  Assessment and plan of treatment:	stable

## 2018-09-01 NOTE — PROGRESS NOTE ADULT - PROBLEM SELECTOR PLAN 2
Now Voiding  c/w Flomax
Renal consult
Renal consult

## 2018-09-01 NOTE — PROGRESS NOTE ADULT - PROVIDER SPECIALTY LIST ADULT
Cardiology
Cardiology
Hospitalist
Nephrology
Psychiatry
Hospitalist

## 2018-09-01 NOTE — PROGRESS NOTE ADULT - PROBLEM SELECTOR PROBLEM 2
Urinary retention

## 2018-09-01 NOTE — DISCHARGE NOTE ADULT - CARE PROVIDER_API CALL
Mark Quarles), Family Medicine Medicine  [address] [place] [address]  Phone: [phone]  Fax: [phone]

## 2018-09-01 NOTE — DISCHARGE NOTE ADULT - MEDICATION SUMMARY - MEDICATIONS TO TAKE
I will START or STAY ON the medications listed below when I get home from the hospital:    budesonide 1 mg/2 mL inhalation suspension  -- inhaled every 12 hours  -- Indication: For COPD exacerbation    predniSONE 20 mg oral tablet  -- 2 tab(s) by mouth once a day  -- Indication: For COPD exacerbation    acetaminophen 325 mg oral tablet  -- 2 tab(s) by mouth every 6 hours, As Needed for pain  -- This product contains acetaminophen.  Do not use  with any other product containing acetaminophen to prevent possible liver damage.    -- Indication: For pain    aspirin 81 mg oral tablet, chewable  -- 1 tab(s) by mouth once a day  -- Indication: For CAD    Mapap 325 mg oral tablet  -- 2 tab(s) by mouth every 4 hours, As Needed  -- Indication: For pain    Flomax 0.4 mg oral capsule  -- 1 cap(s) by mouth once a day  -- Indication: For Urinary retention    dilTIAZem 120 mg oral tablet  -- orally once a day  -- Indication: For Coronary artery disease involving native coronary artery of native heart without angina pectoris    gabapentin 300 mg oral capsule  -- orally once a day (at bedtime)  -- Indication: For Neuropathy    DULoxetine 60 mg oral delayed release capsule  -- 1 cap(s) by mouth once a day  -- Indication: For Nerve pain    DULoxetine 30 mg oral delayed release capsule  -- 1 cap(s) by mouth once a day 6pm  -- Indication: For Nerve pain    Lantus 100 units/mL subcutaneous solution  -- 40 unit(s) subcutaneous once a day (in the morning)  -- Indication: For DM (diabetes mellitus)    metoclopramide 10 mg oral tablet  -- orally once a day (at bedtime)  -- Indication: For GERD    atorvastatin 20 mg oral tablet  -- 1 tab(s) by mouth once a day (at bedtime)  -- Indication: For HLD    clopidogrel 75 mg oral tablet  -- 1 tab(s) by mouth once a day  -- Indication: For MI    ALPRAZolam 0.5 mg oral tablet  -- orally once a day (at bedtime)  -- Indication: For Anxiety    carvedilol 25 mg oral tablet  -- 1 tab(s) by mouth 2 times a day  -- Indication: For CAD    ipratropium-albuterol 0.5 mg-2.5 mg/3 mLinhalation solution  -- 3 milliliter(s) inhaled 4 times a day, As Needed  -- Indication: For COPD exacerbation    ethacrynic acid 25 mg oral tablet  -- 1 tab(s) by mouth once a day  -- Indication: For Diuretic    furosemide 40 mg oral tablet  -- 40 milligram(s) by mouth every other day  -- Indication: For Diuretic    omeprazole 20 mg oral delayed release capsule  -- 1 cap(s) by mouth once a day  -- Indication: For Gerd I will START or STAY ON the medications listed below when I get home from the hospital:    budesonide 1 mg/2 mL inhalation suspension  -- inhaled every 12 hours  -- Indication: For COPD exacerbation    predniSONE 20 mg oral tablet  -- 2 tab(s) by mouth once a day  -- Indication: For COPD exacerbation    acetaminophen 325 mg oral tablet  -- 2 tab(s) by mouth every 6 hours, As Needed for pain  -- This product contains acetaminophen.  Do not use  with any other product containing acetaminophen to prevent possible liver damage.    -- Indication: For pain    Mapap 325 mg oral tablet  -- 2 tab(s) by mouth every 4 hours, As Needed  -- Indication: For pain    aspirin 81 mg oral tablet, chewable  -- 1 tab(s) by mouth once a day  -- Indication: For CAD    Flomax 0.4 mg oral capsule  -- 2 cap(s) by mouth once a day  -- Indication: For BPH    gabapentin 300 mg oral capsule  -- orally once a day (at bedtime)  -- Indication: For Neuropathy    DULoxetine 30 mg oral delayed release capsule  -- 2 cap(s) by mouth 2 times a day   -- Indication: For Depression    Lantus 100 units/mL subcutaneous solution  -- 40 unit(s) subcutaneous once a day (in the morning)  -- Indication: For DM (diabetes mellitus)    metoclopramide 10 mg oral tablet  -- orally once a day (at bedtime)  -- Indication: For GERD    atorvastatin 20 mg oral tablet  -- 1 tab(s) by mouth once a day (at bedtime)  -- Indication: For HLD    clopidogrel 75 mg oral tablet  -- 1 tab(s) by mouth once a day  -- Indication: For MI    ALPRAZolam 0.5 mg oral tablet  -- orally once a day (at bedtime)  -- Indication: For Anxiety    carvedilol 25 mg oral tablet  -- 1 tab(s) by mouth 2 times a day  -- Indication: For CAD    ipratropium-albuterol 0.5 mg-2.5 mg/3 mLinhalation solution  -- 3 milliliter(s) inhaled 4 times a day, As Needed  -- Indication: For COPD exacerbation    ethacrynic acid 25 mg oral tablet  -- 1 tab(s) by mouth once a day  -- Indication: For Diuretic    furosemide 40 mg oral tablet  -- 40 milligram(s) by mouth every other day  -- Indication: For Diuretic    omeprazole 20 mg oral delayed release capsule  -- 1 cap(s) by mouth once a day  -- Indication: For Gerd

## 2019-05-30 NOTE — H&P ADULT - NSHPOUTPATIENTPROVIDERS_GEN_ALL_CORE
Patient: Damian Larsen    Procedure Summary     Date:  05/30/19 Room / Location:   ENDOSCOPIC ULTRASOUND ROOM / SURGERY [facility]    Anesthesia Start:  0748 Anesthesia Stop:  0839    Procedure:  COLONOSCOPY - W/POSS BX, DILATION, POLYPECTOMY, CONTROL OF HEMORRHAGE, W/ENDOSCOPIC MUCOSAL RESECTION (Anus) Diagnosis:  (same)    Surgeon:  Will Alvarenga M.D. Responsible Provider:  Hossein Abel M.D.    Anesthesia Type:  MAC ASA Status:  3          Final Anesthesia Type: MAC  Last vitals  BP   Blood Pressure : (!) 92/56    Temp   36.3 °C (97.3 °F)    Pulse   Pulse: 89   Resp   16    SpO2   95 %      Anesthesia Post Evaluation    Patient location during evaluation: PACU  Patient participation: complete - patient participated  Level of consciousness: awake and alert  Pain score: 0    Airway patency: patent  Anesthetic complications: no  Cardiovascular status: hemodynamically stable  Respiratory status: acceptable  Hydration status: euvolemic    PONV: none                   
PCP Robina

## 2019-06-17 NOTE — PATIENT PROFILE ADULT. - FUNCTIONAL SCREEN CURRENT LEVEL: TOILETING, MLM
Health Maintenance Due   Topic Date Due   • Shingles Vaccine (1 of 2) 08/05/2010       Patient is due for topics as listed above but is not proceeding with  at this time.       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

## 2020-03-04 NOTE — ED ADULT TRIAGE NOTE - MEANS OF ARRIVAL
Breast Cancer Screening: During our visit today, we discussed that it is recommended you receive breast cancer screening. Please call or make an appointment with your primary care provider to discuss this with them. You may also call the [facility] scheduling line ([phone]) to set up a mammography appointment at the Breast [facility] within the [facility].    
wheelchair

## 2020-08-07 NOTE — PROGRESS NOTE ADULT - PROBLEM SELECTOR PLAN 1
CT Abd: Cystitis. No hydroureteronephrosis bilaterally.  Appreciate Renal recs  Continue Aztreonam CT Abd: Cystitis. No hydroureteronephrosis bilaterally.  Appreciate Renal recs  Continue Aztreonam  f/u UA, UCx Non-Graft Cartilage Fenestration Text: The cartilage was fenestrated with a 2mm punch biopsy to help facilitate healing.

## 2020-09-16 NOTE — PROGRESS NOTE ADULT - PROBLEM/PLAN-8
Bed: B18  Expected date:   Expected time:   Means of arrival:   Comments:  SUZI  
DISPLAY PLAN FREE TEXT

## 2021-01-01 NOTE — ED ADULT NURSE NOTE - NSFALLRSKHARMRISK_ED_ALL_ED
Sarika Dariana LYN RN  [facility]       no

## 2021-03-22 NOTE — ED ADULT NURSE NOTE - CAS DISCH TRANSFER METHOD
"ASSESSMENT & PLAN  Patient Instructions     1. Pain in joint of right shoulder    2. Rotator cuff syndrome of right shoulder    3. Biceps tendinitis of right upper extremity    4. Carpal tunnel syndrome of right wrist      -Patient has right shoulder pain due to rotator cuff and biceps tendinitis.  Patient also has numbness and tingling into her right hand due to carpal tunnel syndrome.  -Patient will start formal physical therapy and home exercise program.  -Patient will start nabumetone 750 mg twice a day as needed.  -Patient will start wearing a cock-up wrist splint at night to offload the wrist joint  -Patient will follow-up in 4 weeks for reevaluation and progression of activity.  -Call direct clinic number [[phone]] at any time with questions or concerns.    Albert Yeo MD [facility]  [facility]          -----    SUBJECTIVE  Prince Patel is a/an 43 year old Right handed female who is seen as a self referral for evaluation of right shoulder pain. The patient is seen by themselves.    Onset: 3 week(s) ago. Patient describes injury as was bowling and something \"popped\" in her shoulder.   Location of Pain: Right anterior GH, sharp pain that shoots down arm, that can cause numbness  Rating of Pain at worst: 10/10  Rating of Pain Currently: 5/10  Worsened by: bowling, lifting, reaching behind, opening things.   Better with: nothing  Treatments tried: rest/activity avoidance, ice, heat, Tylenol, ibuprofen and Aleve  Associated symptoms: numbness and tingling, feels a popping when playing video games.  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)     Other acne      Social History     Socioeconomic History     Marital status: Single     Spouse name: Not on file     Number of children: 0     Years of education: Not on file     Highest education level: Not on file   Occupational History     Occupation: cleaning     Comment: archiver's   Social Needs     Financial resource strain: Not on file     Food insecurity:     Worry: Not on file     Inability: Not on file     Transportation needs:     Medical: Not on file     Non-medical: Not on file   Tobacco Use     Smoking status: Never Smoker     Smokeless tobacco: Never Used   Substance and Sexual Activity     Alcohol use: No     Drug use: No     Sexual activity: Never     Partners: Male   Lifestyle     Physical activity:     Days per week: Not on file     Minutes per session: Not on file     Stress: Not on file   Relationships     Social connections:     Talks on phone: Not on file     Gets together: Not on file     Attends [employer] witness service: Not on file     Active member of club or organization: Not on file     Attends meetings of clubs or organizations: Not on file     Relationship status: Not on file     Intimate partner violence:     Fear of current or ex partner: Not on file     Emotionally abused: Not on file     Physically abused: Not on file     Forced sexual activity: Not on file   Other Topics Concern      Service Not Asked     Blood Transfusions Not Asked     Caffeine Concern Yes     Comment: 1 pop daily     Occupational Exposure Not Asked     Hobby Hazards Not Asked     Sleep Concern Not Asked     Stress Concern Not Asked     Weight Concern Not  "Asked     Special Diet No     Comment: calcium daily     Back Care Not Asked     Exercise Yes     Comment: rollerblade, bike     Bike Helmet Not Asked     Seat Belt Yes     Self-Exams Yes     Parent/sibling w/ CABG, MI or angioplasty before 65F 55M? Yes   Social History Narrative    bowling for special [employer], local tournaments         Patient's past medical, surgical, social, and family histories were reviewed today and no changes are noted.    REVIEW OF SYSTEMS:  10 point ROS is negative other than symptoms noted above in HPI, Past Medical History or as stated below  Constitutional: NEGATIVE for fever, chills, change in weight  Skin: NEGATIVE for worrisome rashes, moles or lesions  GI/: NEGATIVE for bowel or bladder changes  Neuro: NEGATIVE for weakness, dizziness or paresthesias    OBJECTIVE:  /80   Ht 1.619 m (5' 3.74\")   Wt 98.4 kg (217 lb)   LMP 10/31/2011   BMI 37.55 kg/m     General: healthy, alert and in no distress  HEENT: no scleral icterus or conjunctival erythema  Skin: no suspicious lesions or rash. No jaundice.  CV: regular rhythm by palpation  Resp: normal respiratory effort without conversational dyspnea   Psych: normal mood and affect  Gait: normal steady gait with appropriate coordination and balance  Neuro: normal light touch sensory exam of the bilateral upper extremities.    MSK:  RIGHT SHOULDER  Inspection:    no swelling, bruising, discoloration, or obvious deformity or asymmetry  Palpation:    Tender about the anterior capsule and greater tuberosity. Remainder of bony and tendinous landmarks are nontender.  Active Range of Motion:     Abduction 1350, FF 1650, , IR L4.      Scapular dyskinesis absent  Strength:    Scapular plane abduction 5-/5,  ER 5-/5, IR 5/5, biceps 5/5, triceps 5/5  Special Tests:    Positive: Neer's, Ferrera', supraspinatus (empty can), crossed arm adduction and Suncook's    Negative: drop arm/painful arc, crossed arm adduction, Speed's and " Sheldon's    RIGHT WRIST  Inspection:    No swelling, bruising, discoloration, or obvious deformity or asymmetry  Palpation:   bony and ligamentous line marks are nontender.    Crepitus is Absent    Metacarpals: normal    Thumb: normal    Fingers: normal  Range of Motion:    Full (active and passive) flexion, extension, pronation/supination, and ulnar/radial deviation.  Strength:    No deficits in flexion, extension, ulnar/radial deviation, or  strength.  Special Tests:    Positive: Phalen's    Negative: Tinel's (carpal tunnel), Finkelstein's, thenar eminence wasting, hypothenar eminence wasting      Independent visualization of the below image:  Recent Results (from the past 24 hour(s))   XR Shoulder Right G/E 3 Views    Narrative    SHOULDER RIGHT THREE OR MORE VIEWS  11/27/2019 3:34 PM     HISTORY: Pain in joint, shoulder region.      Impression    IMPRESSION: There is a small focus of calcification adjacent to the  lesser tuberosity. This may well represent a subscapularis region  focus of calcific tendinitis/bursitis. Otherwise unremarkable.    ALAN LAORR, MD Albert Yeo MD [facility]       [employer] service

## 2022-05-16 NOTE — ED PROVIDER NOTE - HEME LYMPH, MLM
No adenopathy or splenomegaly. No cervical or inguinal lymphadenopathy.
Continue ASA 81 mg DOS  Pending Cardiac Eval.

## 2024-05-28 NOTE — CHART NOTE - NSCHARTNOTEFT_GEN_A_CORE
Source: Patient [ ]    Family [ ]     other [ ]    Diet :       Patient reports [ ] nausea  [ ] vomiting [ ] diarrhea [ ] constipation  [ ]chewing problems [ ] swallowing issues  [ ] other:      PO intake:  < 50% [ ] 50-75% [x ]   % [ ]  other :     Source for PO intake [ ] Patient [ ] family [ ] chart [ ] staff [ ] other     Enteral /Parenteral Nutrition:       Current Weight: Weight (kg): 115.9 (08-30 @ 05:00)  % Weight Change    Pertinent Medications: MEDICATIONS  (STANDING):  ALPRAZolam 0.5 milliGRAM(s) Oral at bedtime  aspirin  chewable 81 milliGRAM(s) Oral daily  atorvastatin 20 milliGRAM(s) Oral at bedtime  aztreonam  IVPB 500 milliGRAM(s) IV Intermittent every 12 hours  buDESOnide   0.5 milliGRAM(s) Respule 1 milliGRAM(s) Inhalation daily  carvedilol 25 milliGRAM(s) Oral every 12 hours  clopidogrel Tablet 75 milliGRAM(s) Oral daily  dextrose 5%. 1000 milliLiter(s) (50 mL/Hr) IV Continuous <Continuous>  dextrose 50% Injectable 12.5 Gram(s) IV Push once  dextrose 50% Injectable 25 Gram(s) IV Push once  dextrose 50% Injectable 25 Gram(s) IV Push once  DULoxetine 120 milliGRAM(s) Oral daily  ethacrynic acid 25 milliGRAM(s) Oral daily  gabapentin 300 milliGRAM(s) Oral at bedtime  insulin glargine Injectable (LANTUS) 40 Unit(s) SubCutaneous every morning  insulin lispro (HumaLOG) corrective regimen sliding scale   SubCutaneous three times a day before meals  metoclopramide 10 milliGRAM(s) Oral at bedtime  pantoprazole    Tablet 40 milliGRAM(s) Oral before breakfast  predniSONE   Tablet 40 milliGRAM(s) Oral daily  tamsulosin 0.8 milliGRAM(s) Oral at bedtime    MEDICATIONS  (PRN):  acetaminophen   Tablet 650 milliGRAM(s) Oral every 6 hours PRN For Temp greater than 38 C (100.4 F)  ALBUTerol/ipratropium for Nebulization 3 milliLiter(s) Nebulizer every 6 hours PRN Shortness of Breath and/or Wheezing  dextrose 40% Gel 15 Gram(s) Oral once PRN Blood Glucose LESS THAN 70 milliGRAM(s)/deciliter  glucagon  Injectable 1 milliGRAM(s) IntraMuscular once PRN Glucose LESS THAN 70 milligrams/deciliter  heparin  Injectable 6000 Unit(s) IV Push every 6 hours PRN For aPTT less than 40    Pertinent Labs:  08-31 Na140 mmol/L Glu 88 mg/dL K+ 4.1 mmol/L Cr  1.77 mg/dL<H> BUN 56 mg/dL<H> 08-25 Alb 2.4 g/dL<L> 08-26 [id number] 5.4 %      Skin: Stage II butt      Nutrition Diagnosis is [ ] ongoing  [x ] resolved [ ] not applicable          New Nutrition Diagnosis: [ x] not applicable           Monitoring and Evaluation:     [ ] PO intake [ ] Tolerance to diet prescription [ ] weights [ x] follow up per protocol    [ ] other:    Tolerating diet.  Fair appetite reported  100% intake noted.  Patient states diet consistency is fine/does not need it chnged. fair balance